# Patient Record
Sex: FEMALE | Race: BLACK OR AFRICAN AMERICAN | NOT HISPANIC OR LATINO | Employment: UNEMPLOYED | URBAN - METROPOLITAN AREA
[De-identification: names, ages, dates, MRNs, and addresses within clinical notes are randomized per-mention and may not be internally consistent; named-entity substitution may affect disease eponyms.]

---

## 2020-02-26 ENCOUNTER — APPOINTMENT (OUTPATIENT)
Dept: GENERAL RADIOLOGY | Facility: CLINIC | Age: 35
End: 2020-02-26
Attending: EMERGENCY MEDICINE
Payer: MEDICAID

## 2020-02-26 ENCOUNTER — HOSPITAL ENCOUNTER (EMERGENCY)
Facility: CLINIC | Age: 35
Discharge: HOME | End: 2020-02-26
Attending: EMERGENCY MEDICINE | Admitting: EMERGENCY MEDICINE
Payer: MEDICAID

## 2020-02-26 VITALS
SYSTOLIC BLOOD PRESSURE: 137 MMHG | HEART RATE: 76 BPM | OXYGEN SATURATION: 100 % | DIASTOLIC BLOOD PRESSURE: 90 MMHG | TEMPERATURE: 96.8 F | RESPIRATION RATE: 16 BRPM

## 2020-02-26 DIAGNOSIS — R07.89 ATYPICAL CHEST PAIN: ICD-10-CM

## 2020-02-26 LAB
ALBUMIN SERPL-MCNC: 3.6 G/DL (ref 3.4–5)
ALP SERPL-CCNC: 123 U/L (ref 40–150)
ALT SERPL W P-5'-P-CCNC: 25 U/L (ref 0–50)
ANION GAP SERPL CALCULATED.3IONS-SCNC: 3 MMOL/L (ref 3–14)
AST SERPL W P-5'-P-CCNC: 14 U/L (ref 0–45)
BASOPHILS # BLD AUTO: 0.1 10E9/L (ref 0–0.2)
BASOPHILS NFR BLD AUTO: 0.8 %
BILIRUB SERPL-MCNC: 0.4 MG/DL (ref 0.2–1.3)
BUN SERPL-MCNC: 10 MG/DL (ref 7–30)
CALCIUM SERPL-MCNC: 9.2 MG/DL (ref 8.5–10.1)
CHLORIDE SERPL-SCNC: 102 MMOL/L (ref 94–109)
CO2 SERPL-SCNC: 30 MMOL/L (ref 20–32)
CREAT SERPL-MCNC: 0.56 MG/DL (ref 0.52–1.04)
D DIMER PPP FEU-MCNC: 0.3 UG/ML FEU (ref 0–0.5)
DIFFERENTIAL METHOD BLD: ABNORMAL
EOSINOPHIL # BLD AUTO: 0.2 10E9/L (ref 0–0.7)
EOSINOPHIL NFR BLD AUTO: 2.4 %
ERYTHROCYTE [DISTWIDTH] IN BLOOD BY AUTOMATED COUNT: 14.4 % (ref 10–15)
GFR SERPL CREATININE-BSD FRML MDRD: >90 ML/MIN/{1.73_M2}
GLUCOSE SERPL-MCNC: 129 MG/DL (ref 70–99)
HCG SERPL QL: NEGATIVE
HCT VFR BLD AUTO: 43.4 % (ref 35–47)
HGB BLD-MCNC: 13.4 G/DL (ref 11.7–15.7)
IMM GRANULOCYTES # BLD: 0 10E9/L (ref 0–0.4)
IMM GRANULOCYTES NFR BLD: 0.1 %
LIPASE SERPL-CCNC: 49 U/L (ref 73–393)
LYMPHOCYTES # BLD AUTO: 3.6 10E9/L (ref 0.8–5.3)
LYMPHOCYTES NFR BLD AUTO: 48 %
MCH RBC QN AUTO: 23.8 PG (ref 26.5–33)
MCHC RBC AUTO-ENTMCNC: 30.9 G/DL (ref 31.5–36.5)
MCV RBC AUTO: 77 FL (ref 78–100)
MONOCYTES # BLD AUTO: 0.5 10E9/L (ref 0–1.3)
MONOCYTES NFR BLD AUTO: 6.3 %
NEUTROPHILS # BLD AUTO: 3.2 10E9/L (ref 1.6–8.3)
NEUTROPHILS NFR BLD AUTO: 42.4 %
NRBC # BLD AUTO: 0 10*3/UL
NRBC BLD AUTO-RTO: 0 /100
PLATELET # BLD AUTO: 436 10E9/L (ref 150–450)
POTASSIUM SERPL-SCNC: 3.1 MMOL/L (ref 3.4–5.3)
PROT SERPL-MCNC: 8.1 G/DL (ref 6.8–8.8)
RBC # BLD AUTO: 5.64 10E12/L (ref 3.8–5.2)
SODIUM SERPL-SCNC: 135 MMOL/L (ref 133–144)
TROPONIN I SERPL-MCNC: <0.015 UG/L (ref 0–0.04)
WBC # BLD AUTO: 7.6 10E9/L (ref 4–11)

## 2020-02-26 PROCEDURE — 84703 CHORIONIC GONADOTROPIN ASSAY: CPT | Performed by: EMERGENCY MEDICINE

## 2020-02-26 PROCEDURE — 85025 COMPLETE CBC W/AUTO DIFF WBC: CPT | Performed by: EMERGENCY MEDICINE

## 2020-02-26 PROCEDURE — 83690 ASSAY OF LIPASE: CPT | Performed by: EMERGENCY MEDICINE

## 2020-02-26 PROCEDURE — 85379 FIBRIN DEGRADATION QUANT: CPT | Performed by: EMERGENCY MEDICINE

## 2020-02-26 PROCEDURE — 25000132 ZZH RX MED GY IP 250 OP 250 PS 637: Performed by: EMERGENCY MEDICINE

## 2020-02-26 PROCEDURE — 93005 ELECTROCARDIOGRAM TRACING: CPT

## 2020-02-26 PROCEDURE — 25000125 ZZHC RX 250: Performed by: EMERGENCY MEDICINE

## 2020-02-26 PROCEDURE — 99285 EMERGENCY DEPT VISIT HI MDM: CPT | Mod: 25

## 2020-02-26 PROCEDURE — 80053 COMPREHEN METABOLIC PANEL: CPT | Performed by: EMERGENCY MEDICINE

## 2020-02-26 PROCEDURE — 84484 ASSAY OF TROPONIN QUANT: CPT | Performed by: EMERGENCY MEDICINE

## 2020-02-26 PROCEDURE — 71046 X-RAY EXAM CHEST 2 VIEWS: CPT

## 2020-02-26 RX ORDER — POTASSIUM CHLORIDE 1.5 G/1.58G
40 POWDER, FOR SOLUTION ORAL ONCE
Status: COMPLETED | OUTPATIENT
Start: 2020-02-26 | End: 2020-02-26

## 2020-02-26 RX ORDER — LIDOCAINE HYDROCHLORIDE 20 MG/ML
15 SOLUTION OROPHARYNGEAL ONCE
Status: COMPLETED | OUTPATIENT
Start: 2020-02-26 | End: 2020-02-26

## 2020-02-26 RX ORDER — ASPIRIN 81 MG/1
324 TABLET, CHEWABLE ORAL ONCE
Status: COMPLETED | OUTPATIENT
Start: 2020-02-26 | End: 2020-02-26

## 2020-02-26 RX ORDER — ALUMINA, MAGNESIA, AND SIMETHICONE 2400; 2400; 240 MG/30ML; MG/30ML; MG/30ML
15 SUSPENSION ORAL ONCE
Status: COMPLETED | OUTPATIENT
Start: 2020-02-26 | End: 2020-02-26

## 2020-02-26 RX ADMIN — ALUMINUM HYDROXIDE, MAGNESIUM HYDROXIDE, AND DIMETHICONE 15 ML: 400; 400; 40 SUSPENSION ORAL at 22:40

## 2020-02-26 RX ADMIN — POTASSIUM CHLORIDE 40 MEQ: 1.5 POWDER, FOR SOLUTION ORAL at 23:31

## 2020-02-26 RX ADMIN — LIDOCAINE HYDROCHLORIDE 15 ML: 20 SOLUTION ORAL; TOPICAL at 22:39

## 2020-02-26 RX ADMIN — ASPIRIN 81 MG 324 MG: 81 TABLET ORAL at 22:41

## 2020-02-26 ASSESSMENT — ENCOUNTER SYMPTOMS
ABDOMINAL PAIN: 1
SHORTNESS OF BREATH: 0

## 2020-02-26 NOTE — ED AVS SNAPSHOT
[facility] Department  [address]  [facility] [address]  Phone:  [phone]  Fax:  [phone]                                    mamta stone   MRN: [id number]    Department:  [facility]   Date of Visit:  2/26/2020           After Visit Summary Signature Page    I have received my discharge instructions, and my questions have been answered. I have discussed any challenges I see with this plan with the nurse or doctor.    ..........................................................................................................................................  Patient/Patient Representative Signature      ..........................................................................................................................................  Patient Representative Print Name and Relationship to Patient    ..................................................               ................................................  Date                                   Time    ..........................................................................................................................................  Reviewed by Signature/Title    ...................................................              ..............................................  Date                                               Time          22EPIC Rev 08/18

## 2020-02-27 LAB — INTERPRETATION ECG - MUSE: NORMAL

## 2020-02-27 NOTE — ED TRIAGE NOTES
Pt in with C/O chest pain onset 2 days ago. Pt denies pain at this time. Reports pain is worse at night when lying down. Pt A&Ox4 ABCD's intact

## 2020-02-27 NOTE — ED PROVIDER NOTES
History     Chief Complaint:  Chest Pain     HPI   mamta stone is a 25 year old female with a history of diabetes, HTN who presents to the emergency department for evaluation of chest pain. The patient reports she has experienced 3 days of intermittent left anterior chest pain. She states the pain was bothering her prior to going to bed, and she had increased anxiety, prompting her arrival to the ED. She further reports some epigastric abdominal pain. She states the pain worsens with deep breaths, denies any shortness of breath. Of note, the patient recently returned from a trip to [place] on 2/6/2020.    Allergies:  NKDA     Medications:    The patient is currently on no regular medications.     Past Medical History:    Diabetes  HTN    Past Surgical History:    The patient does not have any pertinent past surgical history.    Family History:    No past pertinent family history.    Social History:  Presents alone.  Never smoker.    Review of Systems   Respiratory: Negative for shortness of breath.    Cardiovascular: Positive for chest pain.   Gastrointestinal: Positive for abdominal pain.   All other systems reviewed and are negative.        Physical Exam     Patient Vitals for the past 24 hrs:   BP Temp Pulse Heart Rate Resp SpO2   02/26/20 2300 (!) 137/90 -- 76 79 16 100 %   02/26/20 2245 (!) 111/92 -- 101 81 15 100 %   02/26/20 2230 (!) 142/97 -- 75 78 12 100 %   02/26/20 2212 (!) 177/107 96.8  F (36  C) 84 -- 16 100 %     Physical Exam  Constitutional:  Oriented to person, place, and time. Well-appearing.  HENT:   Head:    Normocephalic.   Mouth/Throat:   Oropharynx is clear and moist.   Eyes:    EOM are normal. Pupils are equal, round, and reactive to light.   Neck:    Neck supple.   Cardiovascular:  Normal rate, regular rhythm and normal heart sounds.      Exam reveals no gallop and no friction rub.       No murmur heard.  Pulmonary/Chest:  Effort normal and breath sounds normal.      No respiratory  distress. No wheezes. No rales.      No reproducible chest wall pain.  Abdominal:   Soft. No distension. No tenderness. No rebound and no guarding.   Musculoskeletal:  Normal range of motion. 2+ distal equal pulses.  No leg calf tenderness, swelling or edema.  Neurological:   Alert and oriented to person, place, and time.           Moves all 4 extremities spontaneously    Skin:    No rash noted. No pallor.     Emergency Department Course   ECG:  Time: 2224  Vent. Rate 75 bpm. AR interval 166. QRS duration 74. QT/QTc 368/410. P-R-T axis 51 18 33.  Normal sinus rhythm.  Normal ECG.  Read time: 2225    Imaging:  Radiology findings were communicated with the patient who voiced understanding of the findings.    XR Chest, 2 Views:  Negative chest.  As per radiology.    Laboratory:  Laboratory findings were communicated with the patient who voiced understanding of the findings.    CBC: WBC: 7.6, HGB: 13.4, PLT: 136  CMP: Glucose 129 (H), Potassium 3.1 (L), o/w WNL (Creatinine: 0.56)  Lipase: 49 (L)    D dimer: 0.3    HCG Qualitative Blood: Negative    2227 Troponin I: <0.015    Interventions:  2239 GI Cocktail 30 mL PO  2241 Aspirin 324 mg PO    Emergency Department Course:  Past medical records, nursing notes, and vitals reviewed.    2228 I performed an exam of the patient as documented above.     EKG obtained in the ED, see results above.     IV was inserted and blood was drawn for laboratory testing, results above.    The patient was sent for a XR Chest while in the emergency department, results above.     2328 I rechecked the patient and discussed the results of her workup thus far.     Findings and plan explained to the Patient. Patient discharged home with instructions regarding supportive care, medications, and reasons to return. The importance of close follow-up was reviewed.     I personally reviewed the laboratory results with the Patient and answered all related questions prior to discharge.     Impression &  Plan     Medical Decision Making:  mamta stone is a 35 year old female who presents with chest pain.  Today's work up in the Emergency Department is negative.  The differential diagnosis of chest pain is broad and includes life threatening etiologies such as acute coronary syndrome, myocardial infarction, pulmonary embolism, acute aortic dissection, amongst others.  The patient's EKG was nonischemic and troponin was normal.  The chest pain symptom complex would be atypical for coronary ischemia.  The patient is low risk for PE and has a negative D-dimer; I feel the risks of CT imaging outweighs any benefits.  Chest xray reveals no evidence of pneumonia or pneumothorax.  No serious etiology for the chest pain were detected today during this visit.   Close follow up with primary care is indicated should the pain continue, as further work up may be performed; this was made clear to the patient, who understands.     Diagnosis:    ICD-10-CM   1. Atypical chest pain R07.89       Disposition:  Discharged to home.    Scribe Disclosure:  Keith JENSEN, am serving as a scribe at 10:24 PM on 2/26/2020 to document services personally performed by Steffen Lundberg MD based on my observations and the provider's statements to me.     [facility] EMERGENCY DEPARTMENT     Steffen Lundberg MD  02/27/20 0242

## 2020-03-10 ENCOUNTER — HOSPITAL ENCOUNTER (EMERGENCY)
Facility: CLINIC | Age: 35
Discharge: HOME | End: 2020-03-10
Attending: EMERGENCY MEDICINE | Admitting: EMERGENCY MEDICINE
Payer: MEDICAID

## 2020-03-10 VITALS
TEMPERATURE: 98 F | SYSTOLIC BLOOD PRESSURE: 135 MMHG | RESPIRATION RATE: 18 BRPM | DIASTOLIC BLOOD PRESSURE: 89 MMHG | WEIGHT: 222 LBS | OXYGEN SATURATION: 97 %

## 2020-03-10 DIAGNOSIS — M25.512 ACUTE PAIN OF LEFT SHOULDER: ICD-10-CM

## 2020-03-10 DIAGNOSIS — N64.4 BREAST PAIN: ICD-10-CM

## 2020-03-10 PROCEDURE — 99282 EMERGENCY DEPT VISIT SF MDM: CPT

## 2020-03-10 RX ORDER — IBUPROFEN 200 MG
400 TABLET ORAL EVERY 8 HOURS PRN
Qty: 60 TABLET | Refills: 0 | Status: SHIPPED | OUTPATIENT
Start: 2020-03-10 | End: 2024-02-22

## 2020-03-10 RX ORDER — TRAMADOL HYDROCHLORIDE 50 MG/1
50 TABLET ORAL EVERY 6 HOURS PRN
Qty: 10 TABLET | Refills: 0 | Status: SHIPPED | OUTPATIENT
Start: 2020-03-10 | End: 2020-03-13

## 2020-03-10 NOTE — ED AVS SNAPSHOT
[facility] Emergency Department  [address]  [facility] [address]  Phone:  [phone]  Fax:  [phone]                                    mamta stone   MRN: [id number]    Department:  [facility] Emergency Department   Date of Visit:  3/10/2020           After Visit Summary Signature Page    I have received my discharge instructions, and my questions have been answered. I have discussed any challenges I see with this plan with the nurse or doctor.    ..........................................................................................................................................  Patient/Patient Representative Signature      ..........................................................................................................................................  Patient Representative Print Name and Relationship to Patient    ..................................................               ................................................  Date                                   Time    ..........................................................................................................................................  Reviewed by Signature/Title    ...................................................              ..............................................  Date                                               Time          22EPIC Rev 08/18

## 2020-03-11 NOTE — ED PROVIDER NOTES
History     Chief Complaint:  Breast Pain    HPI   mamta stone is a 35 year old female who presents with left breast pain. The patient reports two days ago, she noticed bumps on her left breast and today started to experience pain. She notes she typically has breast tenderness with her periods but notes she is not on her period and denies other pain similar to this in the past. She notes she has felt hot but denies rashes, nipple discharge, recent injury to her breast, and chance of pregnancy. She reports her breast hurts more when she raises her arm.     Allergies:  No known drug allergies    Medications:    The patient is not currently taking any prescribed medications.    Past Medical History:    The patient does not have any past pertinent medical history.    Past Surgical History:    History reviewed. No pertinent surgical history.    Family History:    History reviewed. No pertinent family history.    Social History:  PCP: Physician No Ref-Primary  Presents to the ED by herself  Marital Status:   [2]    Review of Systems   Musculoskeletal:        Left breast pain   Skin: Negative for rash.   All other systems reviewed and are negative.    Physical Exam     Patient Vitals for the past 24 hrs:   BP Temp Temp src Resp SpO2 Weight   03/10/20 2006 135/89 98  F (36.7  C) Temporal 18 97 % 100.7 kg (222 lb)     Physical Exam  General: Patient is alert and interactive when I enter the room  Head:  The scalp, face, and head appear normal  Eyes:  Conjunctivae are normal  ENT:    The nose is normal    Pinnae are normal    External acoustic canals are normal  Neck:  Trachea midline  CV:  Pulses are normal.    Resp:  No respiratory distress   Breast:  Tenderness to left lateral upper breast with mild firmness palpated in this area, no overlying skin changes, no nipple discharge, no fluctuance   Abdomen:      Soft, non-tender, non-distended  Musc:  Normal muscular tone    No major joint effusions    No asymmetric  leg swelling    Tenderness to left proximal bicep insertion, ROM intact although with pain     No warmth or effusion  Skin:  No rash or lesions noted  Neuro:  Speech is normal and fluent. Face is symmetric.     Moving all extremities well.   Psych: Awake. Alert.  Normal affect.  Appropriate interactions.    Emergency Department Course   Procedures:  None    Emergency Department Course:  Past medical records, nursing notes, and vitals reviewed.  2048: I performed an exam of the patient and obtained history, as documented above.    Findings and plan explained to the Patient. Patient discharged home with instructions regarding supportive care, medications, and reasons to return. The importance of close follow-up was reviewed.     Impression & Plan    Medical Decision Making:  Sheila stone is a 36 yo F with a history of DM who presents with breast pain and left shoulder pain.  She does have some firmness in her left upper lateral breast.  There are no signs of infection such as cellulitis or abscess.  This could be fibrocystic changes she does report she does have breast pain around her..  I think she needs breast imaging which she has not had recently.  I will refer her to the breast center so she can get either mammogram or ultrasound depending on which they feel would be best for her.  She also complained of left shoulder pain she has significant tenderness on her left bicep insertion.  She could have some degree of biceps tendinitis.  I instruction ibuprofen for pain control.  Gave her a short course of tramadol as well he actually has an appointment with her primary on Friday and I discussed she can return to [facility] and get further work-up at that time.  I doubt cardiac etiology as she has reproducible pain and description of her pain seems very musculoskeletal in nature.  Patient discharged    Diagnosis:    ICD-10-CM    1. Breast pain  N64.4    2. Acute pain of left shoulder  M25.512       Disposition:  discharged to home    Discharge Medications:  Discharge Medication List as of 3/10/2020  9:23 PM      START taking these medications    Details   ibuprofen (ADVIL/MOTRIN) 200 MG tablet Take 2 tablets (400 mg) by mouth every 8 hours as needed for pain, Disp-60 tablet,R-0, Local Print           Roberto Patton  3/10/2020   [facility] EMERGENCY DEPARTMENT  I, Roberto Patton, am serving as a scribe at 8:48 PM on 3/10/2020 to document services personally performed by Sonia Velasquez MD based on my observations and the provider's statements to me.      Sonia Velasquez MD  03/12/20 6514

## 2020-03-11 NOTE — DISCHARGE INSTRUCTIONS
Need to make an appointment with the breast clinic.  You likely need a mammogram and/or ultrasound.  You also need to follow-up with your primary on Friday to discuss your left shoulder pain.  You could have bicep tendinitis.  The treatment of this is NSAIDs and rest.  Fevers or worsening pain.

## 2022-04-24 ENCOUNTER — HOSPITAL ENCOUNTER (EMERGENCY)
Facility: CLINIC | Age: 37
Discharge: HOME | End: 2022-04-24
Attending: PHYSICIAN ASSISTANT | Admitting: PHYSICIAN ASSISTANT
Payer: COMMERCIAL

## 2022-04-24 VITALS
OXYGEN SATURATION: 100 % | HEART RATE: 106 BPM | DIASTOLIC BLOOD PRESSURE: 94 MMHG | TEMPERATURE: 98.1 F | RESPIRATION RATE: 18 BRPM | SYSTOLIC BLOOD PRESSURE: 134 MMHG

## 2022-04-24 DIAGNOSIS — J02.9 SORE THROAT: ICD-10-CM

## 2022-04-24 LAB
DEPRECATED S PYO AG THROAT QL EIA: NEGATIVE
FLUAV RNA SPEC QL NAA+PROBE: NEGATIVE
FLUBV RNA RESP QL NAA+PROBE: NEGATIVE
RSV RNA SPEC NAA+PROBE: NEGATIVE
SARS-COV-2 RNA RESP QL NAA+PROBE: NEGATIVE

## 2022-04-24 PROCEDURE — C9803 HOPD COVID-19 SPEC COLLECT: HCPCS

## 2022-04-24 PROCEDURE — 87651 STREP A DNA AMP PROBE: CPT | Performed by: PHYSICIAN ASSISTANT

## 2022-04-24 PROCEDURE — 250N000013 HC RX MED GY IP 250 OP 250 PS 637: Performed by: PHYSICIAN ASSISTANT

## 2022-04-24 PROCEDURE — 99283 EMERGENCY DEPT VISIT LOW MDM: CPT

## 2022-04-24 PROCEDURE — 87637 SARSCOV2&INF A&B&RSV AMP PRB: CPT | Performed by: PHYSICIAN ASSISTANT

## 2022-04-24 RX ORDER — ACETAMINOPHEN 325 MG/1
650 TABLET ORAL ONCE
Status: COMPLETED | OUTPATIENT
Start: 2022-04-24 | End: 2022-04-24

## 2022-04-24 RX ADMIN — ACETAMINOPHEN 650 MG: 325 TABLET, FILM COATED ORAL at 21:24

## 2022-04-24 ASSESSMENT — ENCOUNTER SYMPTOMS
COUGH: 0
RHINORRHEA: 0
VOICE CHANGE: 0
SORE THROAT: 1
HEADACHES: 0
MYALGIAS: 0
FEVER: 0

## 2022-04-25 LAB — GROUP A STREP BY PCR: NOT DETECTED

## 2022-04-25 NOTE — ED PROVIDER NOTES
History   Chief Complaint:  Pharyngitis       HPI   Sheila Vazquez is a 37 year old female with history of hypertension who presents with sore throat with associated pain while swallowing for the last 2 days. She notes that she had recurrent strep throat when she was in high school. Denies runny nose, headaches, fever, myalgias, cough, or voice changes. No other sick contacts.     Review of Systems   Constitutional: Negative for fever.   HENT: Positive for sore throat. Negative for rhinorrhea and voice change.    Respiratory: Negative for cough.    Musculoskeletal: Negative for myalgias.   Neurological: Negative for headaches.   All other systems reviewed and are negative.    Allergies:  The patient has no known allergies.     Medications:  Depo-provera    Past Medical History:     Type 2 diabetes  Hyperlipidemia  Hypertension    Past Surgical History:     section  Female circumcision  Gastric bypass    Family History:    Mother: Diabetes  Sister: Anxiety, type 2 diabetes    Social History:  The patient presents to the ED alone.   Has a 1 year old at home.     Physical Exam     Patient Vitals for the past 24 hrs:   BP Temp Temp src Pulse Resp SpO2   22 2055 (!) 134/94 98.1  F (36.7  C) Temporal 106 18 100 %     Physical Exam  General: Well appearing, pleasant female, resting on exam bed  HEENT: No evidence of trauma.  Conjunctive are clear. Neck range of motion intact.  Nose and throat clear.  Erythematous posterior oropharynx. No PTA or neck tenderness.   Respiratory: Good effort  Cardiovascular: Good distal perfusion  Gastrointestinal: Nondistended  Musculoskeletal: Atraumatic  Skin: Exposed skin clear.  Neurologic: Alert.  Psych:  Patient is cooperative, with normal affect.    Emergency Department Course     Laboratory:  Labs Ordered and Resulted from Time of ED Arrival to Time of ED Departure   INFLUENZA A/B & SARS-COV2 PCR MULTIPLEX - Normal       Result Value    Influenza A PCR Negative       Influenza B PCR Negative      RSV PCR Negative      SARS CoV2 PCR Negative     STREPTOCOCCUS A RAPID SCREEN W REFELX TO PCR - Normal    Group A Strep antigen Negative     GROUP A STREPTOCOCCUS PCR THROAT SWAB      Emergency Department Course:         Reviewed:  I reviewed nursing notes, vitals and past medical history    Assessments:  2108 I obtained history and examined the patient as noted above.     2214 I rechecked the patient and explained findings.     Interventions:  2124 Tylenol 650 mg PO    Disposition:  The patient was discharged to home.     Impression & Plan     Medical Decision Making:  Sheila Vazquez is a 37 year old female who presented with sore throat and clinical evidence of pharyngitis.  The rapid strep test is negative, and formal PCR has been set up in the lab. There is no clinical evidence of  peritonsillar abscess, retropharyngeal abscess, Lemierre's Syndrome, epiglottis, or Lazaro's angina. The etiology is most likely viral.  The patient's symptoms are consistent with viral pharyngitis.  I have recommended treatment with analgesics, and we will await formal PCR results.  If the culture is positive, someone will call the patient to initiate anti-microbial therapy. Return if increasing pain, change in voice, neck pain, vomiting, fever, or shortness of breath. Follow-up with primary physician if not improving in 3-5 days. covid and influenza pending.     Covid-19  Sheila Vazquez was evaluated during a global COVID-19 pandemic, which necessitated consideration that the patient might be at risk for infection with the SARS-CoV-2 virus that causes COVID-19.   Applicable protocols for evaluation were followed during the patient's care.   COVID-19 was considered as part of the patient's evaluation. The plan for testing is:  a test was obtained during this visit.    Diagnosis:    ICD-10-CM    1. Sore throat  J02.9      Scribe Disclosure:  Freddy JENSEN, am serving as a scribe at 9:10 PM on 4/24/2022  to document services personally performed by Rigoberto Carter PA-C based on my observations and the provider's statements to me.         Rigoberto Carter PA-C  04/24/22 [id number]

## 2022-11-26 ENCOUNTER — HOSPITAL ENCOUNTER (EMERGENCY)
Facility: CLINIC | Age: 37
Discharge: HOME | End: 2022-11-26
Attending: INTERNAL MEDICINE | Admitting: INTERNAL MEDICINE
Payer: COMMERCIAL

## 2022-11-26 VITALS
TEMPERATURE: 98 F | RESPIRATION RATE: 14 BRPM | OXYGEN SATURATION: 100 % | SYSTOLIC BLOOD PRESSURE: 127 MMHG | WEIGHT: 210.4 LBS | HEART RATE: 74 BPM | DIASTOLIC BLOOD PRESSURE: 73 MMHG

## 2022-11-26 DIAGNOSIS — D64.9 ANEMIA, UNSPECIFIED TYPE: ICD-10-CM

## 2022-11-26 DIAGNOSIS — N93.8 DYSFUNCTIONAL UTERINE BLEEDING: ICD-10-CM

## 2022-11-26 LAB
ANION GAP SERPL CALCULATED.3IONS-SCNC: 11 MMOL/L (ref 7–15)
BASOPHILS # BLD AUTO: 0 10E3/UL (ref 0–0.2)
BASOPHILS NFR BLD AUTO: 1 %
BUN SERPL-MCNC: 13.6 MG/DL (ref 6–20)
CALCIUM SERPL-MCNC: 8.6 MG/DL (ref 8.6–10)
CHLORIDE SERPL-SCNC: 104 MMOL/L (ref 98–107)
CREAT SERPL-MCNC: 0.6 MG/DL (ref 0.51–0.95)
DEPRECATED HCO3 PLAS-SCNC: 22 MMOL/L (ref 22–29)
EOSINOPHIL # BLD AUTO: 0.1 10E3/UL (ref 0–0.7)
EOSINOPHIL NFR BLD AUTO: 1 %
ERYTHROCYTE [DISTWIDTH] IN BLOOD BY AUTOMATED COUNT: 18.9 % (ref 10–15)
GFR SERPL CREATININE-BSD FRML MDRD: >90 ML/MIN/1.73M2
GLUCOSE SERPL-MCNC: 167 MG/DL (ref 70–99)
HCG SERPL QL: NEGATIVE
HCT VFR BLD AUTO: 28.4 % (ref 35–47)
HGB BLD-MCNC: 7 G/DL (ref 11.7–15.7)
HOLD SPECIMEN: NORMAL
IMM GRANULOCYTES # BLD: 0 10E3/UL
IMM GRANULOCYTES NFR BLD: 0 %
LYMPHOCYTES # BLD AUTO: 2.1 10E3/UL (ref 0.8–5.3)
LYMPHOCYTES NFR BLD AUTO: 44 %
MCH RBC QN AUTO: 15.1 PG (ref 26.5–33)
MCHC RBC AUTO-ENTMCNC: 24.6 G/DL (ref 31.5–36.5)
MCV RBC AUTO: 61 FL (ref 78–100)
MONOCYTES # BLD AUTO: 0.4 10E3/UL (ref 0–1.3)
MONOCYTES NFR BLD AUTO: 8 %
NEUTROPHILS # BLD AUTO: 2.2 10E3/UL (ref 1.6–8.3)
NEUTROPHILS NFR BLD AUTO: 46 %
NRBC # BLD AUTO: 0 10E3/UL
NRBC BLD AUTO-RTO: 0 /100
PLAT MORPH BLD: NORMAL
PLATELET # BLD AUTO: 397 10E3/UL (ref 150–450)
POTASSIUM SERPL-SCNC: 3.7 MMOL/L (ref 3.4–5.3)
RBC # BLD AUTO: 4.64 10E6/UL (ref 3.8–5.2)
RBC MORPH BLD: NORMAL
SODIUM SERPL-SCNC: 137 MMOL/L (ref 136–145)
TROPONIN T SERPL HS-MCNC: 6 NG/L
WBC # BLD AUTO: 4.9 10E3/UL (ref 4–11)

## 2022-11-26 PROCEDURE — 258N000003 HC RX IP 258 OP 636: Performed by: INTERNAL MEDICINE

## 2022-11-26 PROCEDURE — 93005 ELECTROCARDIOGRAM TRACING: CPT

## 2022-11-26 PROCEDURE — 36415 COLL VENOUS BLD VENIPUNCTURE: CPT | Performed by: INTERNAL MEDICINE

## 2022-11-26 PROCEDURE — 85025 COMPLETE CBC W/AUTO DIFF WBC: CPT | Performed by: INTERNAL MEDICINE

## 2022-11-26 PROCEDURE — 96360 HYDRATION IV INFUSION INIT: CPT

## 2022-11-26 PROCEDURE — 84703 CHORIONIC GONADOTROPIN ASSAY: CPT | Performed by: INTERNAL MEDICINE

## 2022-11-26 PROCEDURE — 96361 HYDRATE IV INFUSION ADD-ON: CPT

## 2022-11-26 PROCEDURE — 84484 ASSAY OF TROPONIN QUANT: CPT | Performed by: INTERNAL MEDICINE

## 2022-11-26 PROCEDURE — 99284 EMERGENCY DEPT VISIT MOD MDM: CPT

## 2022-11-26 PROCEDURE — 80048 BASIC METABOLIC PNL TOTAL CA: CPT | Performed by: INTERNAL MEDICINE

## 2022-11-26 RX ORDER — FERROUS GLUCONATE 324(38)MG
324 TABLET ORAL 2 TIMES DAILY WITH MEALS
Qty: 60 TABLET | Refills: 0 | Status: SHIPPED | OUTPATIENT
Start: 2022-11-26

## 2022-11-26 RX ADMIN — SODIUM CHLORIDE 1000 ML: 9 INJECTION, SOLUTION INTRAVENOUS at 16:47

## 2022-11-26 ASSESSMENT — ENCOUNTER SYMPTOMS
DIZZINESS: 1
DIARRHEA: 0

## 2022-11-26 ASSESSMENT — ACTIVITIES OF DAILY LIVING (ADL): ADLS_ACUITY_SCORE: 35

## 2022-11-26 NOTE — ED PROVIDER NOTES
History   Chief Complaint:  Dizziness       HPI   Sheila Vazquez is a 37 year old female with history of hypertension, type II diabetes mellitus, vasovagal syncope, anemia who presents via EMS with dizziness. She was shopping in a store and when she was at the  she was dizzy and felt like her head was spinning and that she needed to sit down. She was not dressed too warm and was not hot in the store. Denies recent illness, diarrhea or leg swelling. She denies chance of pregnancy as she is currently on her menstrual period. She reports that she feels like her hearing is blocked in both of her ears.     Review of Systems   Cardiovascular: Negative for leg swelling.   Gastrointestinal: Negative for diarrhea.   Neurological: Positive for dizziness.   All other systems reviewed and are negative.      Allergies:  The patient has no known allergies.     Medications:  Glucophage  Jardiance  Zestril  Ferrous sulfate  Lipitor    Past Medical History:     Hypertension  Vasovagal syncope  Acute blood loss anemia  Chronic idiopathic constipation  Hyperlipidemia   Type II diabetes mellitus  Kidney stones  Arrhythmia    Past Surgical History:     section  Gastric bypass  Female circumcision    Family History:    Mother - hypertension, diabetes  Sister - type II diabetes, heart disease, anxiety    Social History:  The patient presents to the ED with her daughter  PCP: No Ref-Primary, Physician       Physical Exam     Patient Vitals for the past 24 hrs:   BP Temp Temp src Pulse Resp SpO2 Weight   22 -- -- -- -- -- 100 % --   22 181 -- -- -- -- -- 100 % --   22 1817 -- -- -- -- -- 100 % --   22 1816 -- -- -- -- -- 100 % --   22 1815 127/73 -- -- 74 -- 100 % --   22 1629 104/62 -- -- -- -- -- --   22 1623 -- 98  F (36.7  C) Oral 80 14 100 % 95.4 kg (210 lb 6.4 oz)       Physical Exam  Constitutional:       Comments: Pleasant and cooperative   HENT:      Mouth/Throat:       Pharynx: No posterior oropharyngeal erythema.   Eyes:      Conjunctiva/sclera: Conjunctivae normal.   Cardiovascular:      Rate and Rhythm: Normal rate and regular rhythm.      Heart sounds: Normal heart sounds.   Pulmonary:      Effort: Pulmonary effort is normal.      Breath sounds: Normal breath sounds.   Abdominal:      General: Bowel sounds are normal. There is no distension.      Palpations: Abdomen is soft.      Tenderness: There is no abdominal tenderness. There is no guarding or rebound.   Musculoskeletal:         General: Normal range of motion.      Cervical back: Neck supple.   Skin:     General: Skin is warm and dry.   Neurological:      Mental Status: She is alert.         Emergency Department Course   ECG  ECG results from 11/26/22   EKG 12-lead, tracing only     Value    Systolic Blood Pressure     Diastolic Blood Pressure     Ventricular Rate 82    Atrial Rate 82    NE Interval 162    QRS Duration 70        QTc 436    P Axis 41    R AXIS 12    T Axis 16    Interpretation ECG      Sinus rhythm  Nonspecific T wave abnormality  Abnormal ECG  When compared with ECG of 26-FEB-2020 22:24,  Nonspecific T wave abnormality now evident in Anterior leads         Laboratory:  Labs Ordered and Resulted from Time of ED Arrival to Time of ED Departure   BASIC METABOLIC PANEL - Abnormal       Result Value    Sodium 137      Potassium 3.7      Chloride 104      Carbon Dioxide (CO2) 22      Anion Gap 11      Urea Nitrogen 13.6      Creatinine 0.60      Calcium 8.6      Glucose 167 (*)     GFR Estimate >90     CBC WITH PLATELETS AND DIFFERENTIAL - Abnormal    WBC Count 4.9      RBC Count 4.64      Hemoglobin 7.0 (*)     Hematocrit 28.4 (*)     MCV 61 (*)     MCH 15.1 (*)     MCHC 24.6 (*)     RDW 18.9 (*)     Platelet Count 397      % Neutrophils 46      % Lymphocytes 44      % Monocytes 8      % Eosinophils 1      % Basophils 1      % Immature Granulocytes 0      NRBCs per 100 WBC 0      Absolute Neutrophils  2.2      Absolute Lymphocytes 2.1      Absolute Monocytes 0.4      Absolute Eosinophils 0.1      Absolute Basophils 0.0      Absolute Immature Granulocytes 0.0      Absolute NRBCs 0.0     TROPONIN T, HIGH SENSITIVITY - Normal    Troponin T, High Sensitivity 6     HCG QUALITATIVE PREGNANCY - Normal    hCG Serum Qualitative Negative     RBC AND PLATELET MORPHOLOGY    Platelet Assessment        Value: Automated Count Confirmed. Platelet morphology is normal.    RBC Morphology Confirmed RBC Indices          Emergency Department Course:         Reviewed:  I reviewed nursing notes, vitals, past medical history and Care Everywhere    Assessments:  1637 I obtained history and examined the patient as noted above.   1840 I rechecked the patient and explained findings.     Interventions:  1647 NS bolus 1L IV    Disposition:  The patient was discharged to home.     Impression & Plan     Medical Decision Making:    Sheila Vazquez is a 37 year old female who presents to the emergency department after an episode of syncope or near syncope while at the store.  Evaluation here is remarkable for anemia with a hemoglobin of 7.0.  She has recently been evaluated for dysfunctional uterine bleeding, and I suspect her anemia is related to menstrual losses.  I discussed with her breath her degree of anemia and her syncopal and near syncopal episode today it would be prudent to give her blood transfusion, especially given her ongoing bleeding.  She clarifies that she is not having any current vaginal bleeding.  After discussion of risks and benefits she declines but will manage with oral iron.  I will have her follow-up closely with primary care.  She should drink plenty of fluid.      Diagnosis:    ICD-10-CM    1. Anemia, unspecified type  D64.9       2. Dysfunctional uterine bleeding  N93.8           Discharge Medications:  New Prescriptions    FERROUS GLUCONATE (FERGON) 324 (38 FE) MG TABLET    Take 1 tablet (324 mg) by mouth 2 times  daily (with meals)       Scribe Disclosure:  I, Monisha Kowalski, am serving as a scribe at 4:37 PM on 11/26/2022 to document services personally performed by Emily Vogel MD based on my observations and the provider's statements to me.              Emily Vogel MD  11/26/22 3817

## 2022-11-26 NOTE — ED TRIAGE NOTES
Pt c/o feeling faint while shopping. Pt slid to floor in checkout line.  BG 170s. VSS ABC in tact.

## 2022-11-26 NOTE — ED NOTES
Bed: ED27  Expected date: 11/26/22  Expected time: 4:08 PM  Means of arrival: Ambulance  Comments:  TO TRIAGE?? BV3 37 f syncope

## 2022-11-28 LAB
ATRIAL RATE - MUSE: 82 BPM
DIASTOLIC BLOOD PRESSURE - MUSE: NORMAL MMHG
INTERPRETATION ECG - MUSE: NORMAL
P AXIS - MUSE: 41 DEGREES
PR INTERVAL - MUSE: 162 MS
QRS DURATION - MUSE: 70 MS
QT - MUSE: 374 MS
QTC - MUSE: 436 MS
R AXIS - MUSE: 12 DEGREES
SYSTOLIC BLOOD PRESSURE - MUSE: NORMAL MMHG
T AXIS - MUSE: 16 DEGREES
VENTRICULAR RATE- MUSE: 82 BPM

## 2023-01-07 ENCOUNTER — HOSPITAL ENCOUNTER (EMERGENCY)
Facility: CLINIC | Age: 38
Discharge: HOME | End: 2023-01-07
Attending: EMERGENCY MEDICINE | Admitting: EMERGENCY MEDICINE
Payer: COMMERCIAL

## 2023-01-07 VITALS
SYSTOLIC BLOOD PRESSURE: 134 MMHG | OXYGEN SATURATION: 100 % | TEMPERATURE: 97.4 F | DIASTOLIC BLOOD PRESSURE: 77 MMHG | HEART RATE: 77 BPM | RESPIRATION RATE: 21 BRPM

## 2023-01-07 DIAGNOSIS — D50.0 IRON DEFICIENCY ANEMIA DUE TO CHRONIC BLOOD LOSS: ICD-10-CM

## 2023-01-07 DIAGNOSIS — N92.0 MENORRHAGIA WITH REGULAR CYCLE: ICD-10-CM

## 2023-01-07 DIAGNOSIS — R42 LIGHTHEADEDNESS: ICD-10-CM

## 2023-01-07 LAB
ANION GAP SERPL CALCULATED.3IONS-SCNC: 12 MMOL/L (ref 7–15)
BASOPHILS # BLD AUTO: 0.1 10E3/UL (ref 0–0.2)
BASOPHILS NFR BLD AUTO: 1 %
BUN SERPL-MCNC: 8.9 MG/DL (ref 6–20)
CALCIUM SERPL-MCNC: 9.5 MG/DL (ref 8.6–10)
CHLORIDE SERPL-SCNC: 99 MMOL/L (ref 98–107)
CREAT SERPL-MCNC: 0.51 MG/DL (ref 0.51–0.95)
DEPRECATED HCO3 PLAS-SCNC: 24 MMOL/L (ref 22–29)
EOSINOPHIL # BLD AUTO: 0.1 10E3/UL (ref 0–0.7)
EOSINOPHIL NFR BLD AUTO: 1 %
ERYTHROCYTE [DISTWIDTH] IN BLOOD BY AUTOMATED COUNT: 21.7 % (ref 10–15)
GFR SERPL CREATININE-BSD FRML MDRD: >90 ML/MIN/1.73M2
GLUCOSE SERPL-MCNC: 184 MG/DL (ref 70–99)
HBA1C MFR BLD: 7.3 %
HCG SERPL QL: NEGATIVE
HCT VFR BLD AUTO: 34.6 % (ref 35–47)
HGB BLD-MCNC: 8.3 G/DL (ref 11.7–15.7)
HOLD SPECIMEN: NORMAL
IMM GRANULOCYTES # BLD: 0 10E3/UL
IMM GRANULOCYTES NFR BLD: 0 %
LYMPHOCYTES # BLD AUTO: 2.3 10E3/UL (ref 0.8–5.3)
LYMPHOCYTES NFR BLD AUTO: 30 %
MCH RBC QN AUTO: 14.7 PG (ref 26.5–33)
MCHC RBC AUTO-ENTMCNC: 24 G/DL (ref 31.5–36.5)
MCV RBC AUTO: 61 FL (ref 78–100)
MONOCYTES # BLD AUTO: 0.5 10E3/UL (ref 0–1.3)
MONOCYTES NFR BLD AUTO: 6 %
NEUTROPHILS # BLD AUTO: 4.7 10E3/UL (ref 1.6–8.3)
NEUTROPHILS NFR BLD AUTO: 62 %
NRBC # BLD AUTO: 0 10E3/UL
NRBC BLD AUTO-RTO: 0 /100
NT-PROBNP SERPL-MCNC: <36 PG/ML (ref 0–450)
PLAT MORPH BLD: NORMAL
PLATELET # BLD AUTO: 343 10E3/UL (ref 150–450)
POTASSIUM SERPL-SCNC: 3.6 MMOL/L (ref 3.4–5.3)
RBC # BLD AUTO: 5.65 10E6/UL (ref 3.8–5.2)
RBC MORPH BLD: NORMAL
SODIUM SERPL-SCNC: 135 MMOL/L (ref 136–145)
TROPONIN T SERPL HS-MCNC: 11 NG/L
TSH SERPL DL<=0.005 MIU/L-ACNC: 2.13 UIU/ML (ref 0.3–4.2)
WBC # BLD AUTO: 7.7 10E3/UL (ref 4–11)

## 2023-01-07 PROCEDURE — 84484 ASSAY OF TROPONIN QUANT: CPT | Performed by: EMERGENCY MEDICINE

## 2023-01-07 PROCEDURE — 84443 ASSAY THYROID STIM HORMONE: CPT | Performed by: EMERGENCY MEDICINE

## 2023-01-07 PROCEDURE — 83880 ASSAY OF NATRIURETIC PEPTIDE: CPT | Performed by: EMERGENCY MEDICINE

## 2023-01-07 PROCEDURE — 96360 HYDRATION IV INFUSION INIT: CPT

## 2023-01-07 PROCEDURE — 84703 CHORIONIC GONADOTROPIN ASSAY: CPT | Performed by: EMERGENCY MEDICINE

## 2023-01-07 PROCEDURE — 99284 EMERGENCY DEPT VISIT MOD MDM: CPT | Mod: 25

## 2023-01-07 PROCEDURE — 83036 HEMOGLOBIN GLYCOSYLATED A1C: CPT | Performed by: EMERGENCY MEDICINE

## 2023-01-07 PROCEDURE — 93005 ELECTROCARDIOGRAM TRACING: CPT

## 2023-01-07 PROCEDURE — 258N000003 HC RX IP 258 OP 636: Performed by: EMERGENCY MEDICINE

## 2023-01-07 PROCEDURE — 80048 BASIC METABOLIC PNL TOTAL CA: CPT | Performed by: EMERGENCY MEDICINE

## 2023-01-07 PROCEDURE — 36415 COLL VENOUS BLD VENIPUNCTURE: CPT | Performed by: EMERGENCY MEDICINE

## 2023-01-07 PROCEDURE — 85025 COMPLETE CBC W/AUTO DIFF WBC: CPT | Performed by: EMERGENCY MEDICINE

## 2023-01-07 RX ADMIN — SODIUM CHLORIDE 1000 ML: 9 INJECTION, SOLUTION INTRAVENOUS at 14:07

## 2023-01-07 ASSESSMENT — ENCOUNTER SYMPTOMS
DIZZINESS: 1
PALPITATIONS: 1
FATIGUE: 1
LIGHT-HEADEDNESS: 1

## 2023-01-07 ASSESSMENT — ACTIVITIES OF DAILY LIVING (ADL): ADLS_ACUITY_SCORE: 35

## 2023-01-07 NOTE — ED PROVIDER NOTES
Rapid Assessment Note    History:   Sheila Vazquez is a 38 year old female who presents with 2 weeks of palpitations, lightheadedness with exertion, feeling tired with lack of energy. Patient [place] the mall 12[place] but did not hit her head. LMP 2 weeks ago.  She states she has heavy periods.  No headaches, chest pain, shortness of breath, leg swelling, melena, blood in stools, N/V/D.  Has not checked a pregnancy test recently.    Exam:   General:  Alert, interactive  Cardiovascular:  Well perfused  Lungs:  No respiratory distress, no accessory muscle use  Neuro:  Moving all 4 extremities  Skin:  Warm, dry  Psych:  Normal affect    ECG normal    Plan of Care:   I evaluated the patient and developed an initial plan of care. I discussed this plan and explained that I, or one of my partners, would be returning to complete the evaluation.         I, Kari Willett MD, am serving as a scribe to document services personally performed by Kari Willett MD, based on my observations and the provider's statements to me.    1/7/2023  [employer]    Portions of this medical record were completed by a scribe. UPON MY REVIEW AND AUTHENTICATION BY ELECTRONIC SIGNATURE, this confirms (a) I performed the applicable clinical services, and (b) the record is accurate.        Kari Willett MD  01/07/23 8033

## 2023-01-07 NOTE — ED TRIAGE NOTES
Patient presents to the ED reporting fatigue, generalized weakness and palpitations with exertion. States has been having symptoms for several weeks but they are getting worse.

## 2023-01-07 NOTE — ED PROVIDER NOTES
History     Chief Complaint:  Palpitations       The history is provided by the patient.      Sheila Vazquez is a 38 year old female with a history of vasovagal syncope, arrhythmia, hypertension, anemia, and T2DM who presents to the ED with palpitations and associated lightheadedness and dizziness. She becomes dizzy frequently when she stands up, and feels increasingly and constantly fatigued as of late. She does not report a change in PO intake. She worries about her functionality due to these symptoms. She notices symptoms exacerbate with movement and they improve with rest. These symptoms have been present for weeks with a previous ED visit on 2022 following an episode of near syncope that was attributed to anemia due to dysfunctional uterine bleeding. No associated pain. Denies recent syncope.     Independent Historian: Yes     Review of External Notes: Family med 22 note, hgb 7.5     ROS:  Review of Systems   Constitutional: Positive for fatigue.   Cardiovascular: Positive for palpitations.   Neurological: Positive for dizziness and light-headedness. Negative for syncope.   All other systems reviewed and are negative.    Allergies:  The patient has no known allergies.     Medications:    Glucophage  Jardiance  Zestril  Ferrous sulfate  Lipitor  Vitamin D     Past Medical History:    Hypertension  Vasovagal syncope  Acute blood loss anemia  Chronic idiopathic constipation  Hyperlipidemia   Type II diabetes mellitus  Kidney stones  Arrhythmia    Past Surgical History:     section  Gastric bypass  Female circumcision    Family History:    Mother - hypertension, diabetes  Sister - type II diabetes, heart disease, anxiety    Social History:  The patient presents to the ED with her daughter.   PCP: [facility], [place]     Physical Exam     Patient Vitals for the past 24 hrs:   BP Temp Pulse Resp SpO2   23 1345 -- -- 85 15 100 %   23 1330 126/75 -- 87 20 100 %   23 1050  136/79 97.4  F (36.3  C) 81 16 100 %        Physical Exam  Constitutional: Alert, attentive, GCS 15  HENT:    Nose: Nose normal.    Eyes: EOM are normal, anicteric, conjugate gaze  CV: regular rate and rhythm; no murmurs  Chest: Effort normal and breath sounds clear without wheezing or rales, symmetric bilaterally   GI:  non tender. No distension. No guarding or rebound.    MSK: No LE edema, no tenderness to palpation of BLE.  Neurological: Alert, attentive, moving all extremities equally.   Skin: Skin is warm and dry.    Emergency Department Course   ECG:  ECG results from 01/07/23   EKG 12-lead, tracing only     Value    Systolic Blood Pressure     Diastolic Blood Pressure     Ventricular Rate 73    Atrial Rate 73    AR Interval 162    QRS Duration 70        QTc 387    P Axis 47    R AXIS 5    T Axis 20    Interpretation ECG      Sinus rhythm  Normal ECG  When compared with ECG of 26-NOV-2022 16:34,  QT has shortened         Imaging:  No orders to display      Report per radiology    Laboratory:  Labs Ordered and Resulted from Time of ED Arrival to Time of ED Departure   BASIC METABOLIC PANEL - Abnormal       Result Value    Sodium 135 (*)     Potassium 3.6      Chloride 99      Carbon Dioxide (CO2) 24      Anion Gap 12      Urea Nitrogen 8.9      Creatinine 0.51      Calcium 9.5      Glucose 184 (*)     GFR Estimate >90     CBC WITH PLATELETS AND DIFFERENTIAL - Abnormal    WBC Count 7.7      RBC Count 5.65 (*)     Hemoglobin 8.3 (*)     Hematocrit 34.6 (*)     MCV 61 (*)     MCH 14.7 (*)     MCHC 24.0 (*)     RDW 21.7 (*)     Platelet Count 343      % Neutrophils 62      % Lymphocytes 30      % Monocytes 6      % Eosinophils 1      % Basophils 1      % Immature Granulocytes 0      NRBCs per 100 WBC 0      Absolute Neutrophils 4.7      Absolute Lymphocytes 2.3      Absolute Monocytes 0.5      Absolute Eosinophils 0.1      Absolute Basophils 0.1      Absolute Immature Granulocytes 0.0      Absolute NRBCs 0.0      HEMOGLOBIN A1C - Abnormal    Hemoglobin A1C 7.3 (*)    TROPONIN T, HIGH SENSITIVITY - Normal    Troponin T, High Sensitivity 11     HCG QUALITATIVE PREGNANCY - Normal    hCG Serum Qualitative Negative     NT PROBNP INPATIENT - Normal    N terminal Pro BNP Inpatient <36     TSH WITH FREE T4 REFLEX - Normal    TSH 2.13     RBC AND PLATELET MORPHOLOGY    Platelet Assessment        Value: Automated Count Confirmed. Platelet morphology is normal.    RBC Morphology Confirmed RBC Indices          Emergency Department Course & Assessments:       Interventions:  Medications   0.9% sodium chloride BOLUS (has no administration in time range)        Independent Interpretation (X-rays, CTs, rhythm strip):  EKG as above     Consultations/Discussion of Management or Tests:  1353 I obtained history and examined patient as noted above.     Social Determinants of Health affecting care:  None identified    Disposition:  The patient was discharged to home.     Impression & Plan      Medical Decision Makin-year-old woman past medical history seen for menorrhagia, anemia, type 2 diabetes presenting for feelings of exertional fatigue, intermittent lightheadedness with concerned her hemoglobin is low.  She has been taking iron supplements does have ongoing heavy periods.  Her hemoglobin here is, up from prior studies at 8.3, was 7.5.  Screening EKG, troponin and BNP are all reassuring.  Pregnancy test is negative.  Her blood sugar is elevated however she has a history of type 2 diabetes, that was not readily reported to me when interviewing the patient and I did send an A1c as a precaution.  I have low suspicion for cardiac etiology, she is PERC negative.  She was given IV fluids and I recommended PCP follow-up for recheck of hemoglobin, consideration alternatives to her menorrhagia though she does not know if she is not having children yet.  Turn precautions reviewed and she was discharged.    Diagnosis:    ICD-10-CM    1.  Lightheadedness  R42       2. Menorrhagia with regular cycle  N92.0       3. Iron deficiency anemia due to chronic blood loss  D50.0              Bro Mcguire MD  [employer]  2:30 PM 01/07/23     Scribe Disclosure:  I, Stephanie Eastman, am serving as a scribe at 12:59 PM on 1/7/2023 to document services personally performed by Bro Mcguire MD based on my observations and the provider's statements to me.     1/7/2023   Bro Mcguire MD Dunbar, John Forrest, MD  01/07/23 1449

## 2023-01-07 NOTE — DISCHARGE INSTRUCTIONS
You should continue to take your medications as prescribed, I recommend staying excessively well-hydrated to see if this helps with your symptoms.  You should make an appointment to follow-up with your primary doctor for recheck.  Should you pass out, develop chest pain you should return here to the emergency department.  You do

## 2023-01-09 LAB
ATRIAL RATE - MUSE: 73 BPM
DIASTOLIC BLOOD PRESSURE - MUSE: NORMAL MMHG
INTERPRETATION ECG - MUSE: NORMAL
P AXIS - MUSE: 47 DEGREES
PR INTERVAL - MUSE: 162 MS
QRS DURATION - MUSE: 70 MS
QT - MUSE: 352 MS
QTC - MUSE: 387 MS
R AXIS - MUSE: 5 DEGREES
SYSTOLIC BLOOD PRESSURE - MUSE: NORMAL MMHG
T AXIS - MUSE: 20 DEGREES
VENTRICULAR RATE- MUSE: 73 BPM

## 2023-04-11 NOTE — ED NOTES
Pt discharged home. Verbal and written instructions given and explained. All questions answered.    
11-Apr-2023 18:24

## 2023-12-14 ENCOUNTER — MEDICAL CORRESPONDENCE (OUTPATIENT)
Dept: HEALTH INFORMATION MANAGEMENT | Facility: CLINIC | Age: 38
End: 2023-12-14
Payer: COMMERCIAL

## 2023-12-14 ENCOUNTER — TRANSFERRED RECORDS (OUTPATIENT)
Dept: HEALTH INFORMATION MANAGEMENT | Facility: CLINIC | Age: 38
End: 2023-12-14
Payer: COMMERCIAL

## 2023-12-14 LAB
ALT SERPL-CCNC: 9 U/L (ref 10–47)
AST SERPL-CCNC: 12 U/L (ref 11–38)
CHOLESTEROL (EXTERNAL): 145 MG/DL (ref 0–200)
CREATININE (EXTERNAL): 0.6 MG/DL (ref 0.7–1.4)
GFR ESTIMATED (EXTERNAL): >60 ML/MIN/1.73M2
GFR ESTIMATED (IF AFRICAN AMERICAN) (EXTERNAL): 72.6 ML/MIN/1.73M2
GLUCOSE (EXTERNAL): 261 MG/DL (ref 70–100)
HBA1C MFR BLD: 9.6 % (ref 4–7)
HDLC SERPL-MCNC: 41 MG/DL (ref 40–60)
LDL CHOLESTEROL CALCULATED (EXTERNAL): 90 MG/DL (ref 0–100)
POTASSIUM (EXTERNAL): 3.5 MMOL/L (ref 3.5–5.3)
TRIGLYCERIDES (EXTERNAL): 71 MG/DL (ref 0–150)

## 2023-12-15 ENCOUNTER — TRANSCRIBE ORDERS (OUTPATIENT)
Dept: OTHER | Age: 38
End: 2023-12-15

## 2023-12-15 DIAGNOSIS — Z98.84 HISTORY OF BARIATRIC SURGERY: Primary | ICD-10-CM

## 2023-12-18 NOTE — TELEPHONE ENCOUNTER
REFERRAL INFORMATION:  Referring Provider: Dr. Hunter  Referring Clinic: [facility]  Reason for Visit/Diagnosis: WLS, BMI 34; [place]       FUTURE VISIT INFORMATION:  Appointment Date: 2/22/2024  Appointment Time: 9 AM     NOTES RECORD STATUS  DETAILS   OFFICE NOTE from Referring Provider Received [facility]:  12/14/23 - PCC OV with Dr. Tirado   OFFICE NOTE from Other Specialists Care Everywhere [facility]:  12/19/22 - PCC OV with ANTONI Fox  11/7/22 - OBGYN OV with Dr. Ascencio  8/5/20 - GEN SUG OV with Dr. Lees   [facility] DISCHARGE SUMMARY/ ED VISITS  Care Everywhere Religious:  11/19/22 - ED OV with Dr. Nicolas   OPERATIVE REPORT N/A 2021 - Bypass in [facility]   PERTINENT LABS Care Everywhere / Internal

## 2024-02-15 ENCOUNTER — TRANSCRIBE ORDERS (OUTPATIENT)
Dept: URGENT CARE | Facility: URGENT CARE | Age: 39
End: 2024-02-15
Payer: COMMERCIAL

## 2024-02-15 DIAGNOSIS — M54.16 LUMBAR BACK PAIN WITH RADICULOPATHY AFFECTING LOWER EXTREMITY: Primary | ICD-10-CM

## 2024-02-22 ENCOUNTER — PRE VISIT (OUTPATIENT)
Dept: ENDOCRINOLOGY | Facility: CLINIC | Age: 39
End: 2024-02-22

## 2024-02-22 ENCOUNTER — TELEPHONE (OUTPATIENT)
Dept: ENDOCRINOLOGY | Facility: CLINIC | Age: 39
End: 2024-02-22

## 2024-02-22 ENCOUNTER — LAB (OUTPATIENT)
Dept: LAB | Facility: CLINIC | Age: 39
End: 2024-02-22
Payer: COMMERCIAL

## 2024-02-22 ENCOUNTER — TELEPHONE (OUTPATIENT)
Dept: GASTROENTEROLOGY | Facility: CLINIC | Age: 39
End: 2024-02-22

## 2024-02-22 ENCOUNTER — OFFICE VISIT (OUTPATIENT)
Dept: ENDOCRINOLOGY | Facility: CLINIC | Age: 39
End: 2024-02-22
Payer: COMMERCIAL

## 2024-02-22 VITALS
WEIGHT: 203.6 LBS | SYSTOLIC BLOOD PRESSURE: 105 MMHG | HEART RATE: 88 BPM | BODY MASS INDEX: 33.92 KG/M2 | OXYGEN SATURATION: 100 % | HEIGHT: 65 IN | DIASTOLIC BLOOD PRESSURE: 73 MMHG

## 2024-02-22 DIAGNOSIS — E11.9 TYPE 2 DIABETES MELLITUS WITHOUT COMPLICATION, WITHOUT LONG-TERM CURRENT USE OF INSULIN (H): Chronic | ICD-10-CM

## 2024-02-22 DIAGNOSIS — D64.9 ANEMIA, UNSPECIFIED TYPE: ICD-10-CM

## 2024-02-22 DIAGNOSIS — Z90.3 S/P GASTRIC SLEEVE PROCEDURE: ICD-10-CM

## 2024-02-22 DIAGNOSIS — D50.0 IRON DEFICIENCY ANEMIA DUE TO CHRONIC BLOOD LOSS: ICD-10-CM

## 2024-02-22 DIAGNOSIS — E66.811 CLASS 1 OBESITY WITH SERIOUS COMORBIDITY AND BODY MASS INDEX (BMI) OF 33.0 TO 33.9 IN ADULT, UNSPECIFIED OBESITY TYPE: ICD-10-CM

## 2024-02-22 DIAGNOSIS — K59.04 CHRONIC IDIOPATHIC CONSTIPATION: ICD-10-CM

## 2024-02-22 DIAGNOSIS — K21.9 GASTROESOPHAGEAL REFLUX DISEASE, UNSPECIFIED WHETHER ESOPHAGITIS PRESENT: ICD-10-CM

## 2024-02-22 PROBLEM — E78.5 HLD (HYPERLIPIDEMIA): Status: ACTIVE | Noted: 2017-09-22

## 2024-02-22 PROBLEM — R87.810 CERVICAL HIGH RISK HPV (HUMAN PAPILLOMAVIRUS) TEST POSITIVE: Status: ACTIVE | Noted: 2020-05-28

## 2024-02-22 PROBLEM — Z98.84 S/P GASTRIC BYPASS: Status: ACTIVE | Noted: 2022-06-01

## 2024-02-22 PROBLEM — H11.023: Status: RESOLVED | Noted: 2017-03-13 | Resolved: 2024-02-22

## 2024-02-22 PROBLEM — I10 HTN (HYPERTENSION): Status: RESOLVED | Noted: 2017-09-22 | Resolved: 2024-02-22

## 2024-02-22 PROBLEM — N97.0 FEMALE INFERTILITY ASSOCIATED WITH ANOVULATION: Status: RESOLVED | Noted: 2022-11-07 | Resolved: 2024-02-22

## 2024-02-22 PROBLEM — D62 ACUTE BLOOD LOSS ANEMIA: Status: RESOLVED | Noted: 2017-09-09 | Resolved: 2024-02-22

## 2024-02-22 PROBLEM — R55 VASOVAGAL SYNCOPE: Status: ACTIVE | Noted: 2017-09-09

## 2024-02-22 PROBLEM — K76.0 FATTY LIVER: Status: ACTIVE | Noted: 2024-02-22

## 2024-02-22 LAB
ALBUMIN SERPL BCG-MCNC: 4.2 G/DL (ref 3.5–5.2)
ALP SERPL-CCNC: 73 U/L (ref 40–150)
ALT SERPL W P-5'-P-CCNC: <5 U/L (ref 0–50)
ANION GAP SERPL CALCULATED.3IONS-SCNC: 10 MMOL/L (ref 7–15)
AST SERPL W P-5'-P-CCNC: 9 U/L (ref 0–45)
BILIRUB SERPL-MCNC: 0.2 MG/DL
BUN SERPL-MCNC: 9.4 MG/DL (ref 6–20)
CALCIUM SERPL-MCNC: 9.2 MG/DL (ref 8.6–10)
CHLORIDE SERPL-SCNC: 104 MMOL/L (ref 98–107)
CHOLEST SERPL-MCNC: 168 MG/DL
CREAT SERPL-MCNC: 0.59 MG/DL (ref 0.51–0.95)
DEPRECATED HCO3 PLAS-SCNC: 24 MMOL/L (ref 22–29)
EGFRCR SERPLBLD CKD-EPI 2021: >90 ML/MIN/1.73M2
ERYTHROCYTE [DISTWIDTH] IN BLOOD BY AUTOMATED COUNT: 19.8 % (ref 10–15)
FASTING STATUS PATIENT QL REPORTED: NO
FERRITIN SERPL-MCNC: 5 NG/ML (ref 6–175)
GLUCOSE SERPL-MCNC: 85 MG/DL (ref 70–99)
HBA1C MFR BLD: 6.6 %
HCT VFR BLD AUTO: 32.4 % (ref 35–47)
HDLC SERPL-MCNC: 40 MG/DL
HGB BLD-MCNC: 8.6 G/DL (ref 11.7–15.7)
IRON BINDING CAPACITY (ROCHE): 397 UG/DL (ref 240–430)
IRON SATN MFR SERPL: 3 % (ref 15–46)
IRON SERPL-MCNC: 13 UG/DL (ref 37–145)
LDLC SERPL CALC-MCNC: 112 MG/DL
MCH RBC QN AUTO: 16 PG (ref 26.5–33)
MCHC RBC AUTO-ENTMCNC: 26.5 G/DL (ref 31.5–36.5)
MCV RBC AUTO: 60 FL (ref 78–100)
NONHDLC SERPL-MCNC: 128 MG/DL
PLATELET # BLD AUTO: 445 10E3/UL (ref 150–450)
POTASSIUM SERPL-SCNC: 4.1 MMOL/L (ref 3.4–5.3)
PROT SERPL-MCNC: 7.5 G/DL (ref 6.4–8.3)
PTH-INTACT SERPL-MCNC: 63 PG/ML (ref 15–65)
RBC # BLD AUTO: 5.37 10E6/UL (ref 3.8–5.2)
SODIUM SERPL-SCNC: 138 MMOL/L (ref 135–145)
TRIGL SERPL-MCNC: 79 MG/DL
VIT B12 SERPL-MCNC: 400 PG/ML (ref 232–1245)
VIT D+METAB SERPL-MCNC: 28 NG/ML (ref 20–50)
WBC # BLD AUTO: 5.4 10E3/UL (ref 4–11)

## 2024-02-22 PROCEDURE — 83970 ASSAY OF PARATHORMONE: CPT | Performed by: PATHOLOGY

## 2024-02-22 PROCEDURE — 83036 HEMOGLOBIN GLYCOSYLATED A1C: CPT

## 2024-02-22 PROCEDURE — 99205 OFFICE O/P NEW HI 60 MIN: CPT

## 2024-02-22 PROCEDURE — 82306 VITAMIN D 25 HYDROXY: CPT

## 2024-02-22 PROCEDURE — 80061 LIPID PANEL: CPT | Performed by: PATHOLOGY

## 2024-02-22 PROCEDURE — 84590 ASSAY OF VITAMIN A: CPT | Mod: 90 | Performed by: PATHOLOGY

## 2024-02-22 PROCEDURE — 99000 SPECIMEN HANDLING OFFICE-LAB: CPT | Performed by: PATHOLOGY

## 2024-02-22 PROCEDURE — 82607 VITAMIN B-12: CPT

## 2024-02-22 PROCEDURE — 83540 ASSAY OF IRON: CPT | Performed by: PATHOLOGY

## 2024-02-22 PROCEDURE — 82728 ASSAY OF FERRITIN: CPT | Performed by: PATHOLOGY

## 2024-02-22 PROCEDURE — 85027 COMPLETE CBC AUTOMATED: CPT | Performed by: PATHOLOGY

## 2024-02-22 PROCEDURE — 80053 COMPREHEN METABOLIC PANEL: CPT | Performed by: PATHOLOGY

## 2024-02-22 PROCEDURE — 83550 IRON BINDING TEST: CPT | Performed by: PATHOLOGY

## 2024-02-22 PROCEDURE — 36415 COLL VENOUS BLD VENIPUNCTURE: CPT | Performed by: PATHOLOGY

## 2024-02-22 PROCEDURE — 99417 PROLNG OP E/M EACH 15 MIN: CPT

## 2024-02-22 RX ORDER — BLOOD-GLUCOSE METER
EACH MISCELLANEOUS
COMMUNITY
Start: 2024-02-17

## 2024-02-22 RX ORDER — MEPERIDINE HYDROCHLORIDE 25 MG/ML
25 INJECTION INTRAMUSCULAR; INTRAVENOUS; SUBCUTANEOUS EVERY 30 MIN PRN
Status: CANCELLED | OUTPATIENT
Start: 2024-02-22

## 2024-02-22 RX ORDER — DIPHENHYDRAMINE HYDROCHLORIDE 50 MG/ML
50 INJECTION INTRAMUSCULAR; INTRAVENOUS
Status: CANCELLED
Start: 2024-02-22

## 2024-02-22 RX ORDER — EPINEPHRINE 1 MG/ML
0.3 INJECTION, SOLUTION, CONCENTRATE INTRAVENOUS EVERY 5 MIN PRN
Status: CANCELLED | OUTPATIENT
Start: 2024-02-22

## 2024-02-22 RX ORDER — ALBUTEROL SULFATE 0.83 MG/ML
2.5 SOLUTION RESPIRATORY (INHALATION)
Status: CANCELLED | OUTPATIENT
Start: 2024-02-22

## 2024-02-22 RX ORDER — METFORMIN HCL 500 MG
500 TABLET, EXTENDED RELEASE 24 HR ORAL
COMMUNITY
Start: 2022-06-15

## 2024-02-22 RX ORDER — PREDNISOLONE ACETATE 10 MG/ML
SUSPENSION/ DROPS OPHTHALMIC
COMMUNITY
Start: 2023-10-25

## 2024-02-22 RX ORDER — EXENATIDE 2 MG/.85ML
INJECTION, SUSPENSION, EXTENDED RELEASE SUBCUTANEOUS
COMMUNITY
Start: 2024-02-05 | End: 2024-09-10

## 2024-02-22 RX ORDER — HEPARIN SODIUM (PORCINE) LOCK FLUSH IV SOLN 100 UNIT/ML 100 UNIT/ML
5 SOLUTION INTRAVENOUS
Status: CANCELLED | OUTPATIENT
Start: 2024-02-22

## 2024-02-22 RX ORDER — MULTIVIT-MIN/IRON/FOLIC ACID/K 45-800-120
1 CAPSULE ORAL 2 TIMES DAILY
Qty: 180 CAPSULE | Refills: 3 | Status: SHIPPED | OUTPATIENT
Start: 2024-02-22

## 2024-02-22 RX ORDER — HEPARIN SODIUM,PORCINE 10 UNIT/ML
5-20 VIAL (ML) INTRAVENOUS DAILY PRN
Status: CANCELLED | OUTPATIENT
Start: 2024-02-22

## 2024-02-22 RX ORDER — METHYLPREDNISOLONE SODIUM SUCCINATE 125 MG/2ML
125 INJECTION, POWDER, LYOPHILIZED, FOR SOLUTION INTRAMUSCULAR; INTRAVENOUS
Status: CANCELLED
Start: 2024-02-22

## 2024-02-22 RX ORDER — POLYETHYLENE GLYCOL 3350 17 G/17G
1 POWDER, FOR SOLUTION ORAL DAILY
Qty: 578 G | Refills: 3 | Status: SHIPPED | OUTPATIENT
Start: 2024-02-22

## 2024-02-22 RX ORDER — ALBUTEROL SULFATE 90 UG/1
1-2 AEROSOL, METERED RESPIRATORY (INHALATION)
Status: CANCELLED
Start: 2024-02-22

## 2024-02-22 ASSESSMENT — PAIN SCALES - GENERAL: PAINLEVEL: SEVERE PAIN (6)

## 2024-02-22 NOTE — NURSING NOTE
"(   Chief Complaint   Patient presents with    New Patient     New re-establish    )    ( Weight: 92.4 kg (203 lb 9.6 oz) )  ( Height: 165.1 cm (5' 5\") )  ( BMI (Calculated): 33.88 )  (   )  ( Cumulative weight loss (lbs): 0 )  (   )  (   )  (   )  (   )    ( BP: 105/73 )  (   )  (   )  (   )  ( Pulse: 88 )  (   )  ( SpO2: 100 % )    ( There is no problem list on file for this patient.   )  (   Current Outpatient Medications   Medication Sig Dispense Refill    BYDUREON BCISE 2 MG/0.85ML auto-injector INJECT 2 MG SUBCUTANEOUSLY ONCE WEEKLY      ferrous gluconate (FERGON) 324 (38 Fe) MG tablet Take 1 tablet (324 mg) by mouth 2 times daily (with meals) 60 tablet 0    metFORMIN (GLUCOPHAGE XR) 500 MG 24 hr tablet Take 500 mg by mouth      vitamin D3 (CHOLECALCIFEROL) 1.25 MG (69084 UT) capsule Take 50,000 Units by mouth      ibuprofen (ADVIL/MOTRIN) 200 MG tablet Take 2 tablets (400 mg) by mouth every 8 hours as needed for pain (Patient not taking: Reported on 2/22/2024) 60 tablet 0    )  ( Diabetes Eval:    )    ( Pain Eval:  Severe Pain (6) )    ( Wound Eval:       )    (   History   Smoking Status    Never   Smokeless Tobacco    Never    )    ( Signed By:  Steffen Alatorre, EMT; February 22, 2024; 8:36 AM )    "

## 2024-02-22 NOTE — LETTER
2024       RE: Sheila Vazquez  [address]  [place] [address]     Dear Colleague,    Thank you for referring your patient, Sheila Vazquez, to the [facility] [place] [facility]. Please see a copy of my visit note below.    Return Bariatric Surgery Note    RE: Sheila Vazquez  MR#: [id number]  : 1985  VISIT DATE: 2024      Dear Clinic, [facility] [place],    I had the pleasure of seeing your patient, Sheila Vazquez, in my post-bariatric surgery assessment clinic.    Assessment & Plan  Problem List Items Addressed This Visit       Chronic idiopathic constipation    Relevant Medications    polyethylene glycol (MIRALAX) 17 GM/Dose powder    Type 2 diabetes mellitus without complication, without long-term current use of insulin (H) (Chronic)     Currently taking Bydureon and Metformin. Has been on both of these medications for the past 2 months. No side effects. Has not been helpful weight weight loss. Checks BS manually 2xdaily. Average fasting 130-150. Will transition to Ozempic. No contraindications. Discussed side effects, risks, and benefits. No hx of pancreatitis. No personal or family hx of MTC or MENII.          Relevant Medications    metFORMIN (GLUCOPHAGE XR) 500 MG 24 hr tablet    BYDUREON BCISE 2 MG/0.85ML auto-injector    semaglutide (OZEMPIC) 2 MG/3ML pen    Other Relevant Orders    Comprehensive metabolic panel (Completed)    Hemoglobin A1c (Completed)    Med Therapy Management Referral    S/P gastric sleeve procedure     S/p sleeve in  in [facility]. She is not positive on what bariatric surgery she had, but was told by the surgeon that 80% of her stomach was removed and denies any intestine changes.     Weight prior to surgery - 118kg - 260lb  Nikolay weight - 80kg - 176lb    Established care today for concerns of weight regain (30lbs in the past 2 years) and new onset abdominal pain and  dysphagia.     She has had epigastric abdominal pain for the past 6-9months that occurs after eating any time. Also has abdominal pain with hunger. Pain will last around 3 hours, and will improve with laying down at times. Denies vomiting or blood in stool. Does have dark stools, but is currently on an iron supplement. Will start omeprazole today and EGD ordered.     Dysphagia symptoms with red meat and chicken that was previously tolerated over the past 6 months. Cannot vomit when tries. Will relieve with rest after a few hours. Will get EGD to further evaluate.          Relevant Medications    Multiple Vitamins-Minerals (BARIATRIC MULTIVITAMINS/IRON) CAPS    Other Relevant Orders    Vitamin A    Vitamin B12 (Completed)    Vitamin D Deficiency (Completed)    Class 1 obesity with serious comorbidity and body mass index (BMI) of 33.0 to 33.9 in adult, unspecified obesity type    Relevant Medications    metFORMIN (GLUCOPHAGE XR) 500 MG 24 hr tablet    BYDUREON BCISE 2 MG/0.85ML auto-injector    semaglutide (OZEMPIC) 2 MG/3ML pen    Other Relevant Orders    Lipid panel reflex to direct LDL Fasting (Completed)    Parathyroid Hormone Intact (Completed)    Gastroesophageal reflux disease, unspecified whether esophagitis present    Relevant Medications    polyethylene glycol (MIRALAX) 17 GM/Dose powder    omeprazole (PRILOSEC) 20 MG DR capsule    Other Relevant Orders    Adult GI  Referral - Procedure Only    Iron deficiency anemia due to chronic blood loss - Primary    Relevant Orders    CBC with platelets    Ferritin    Iron and iron binding capacity     Other Visit Diagnoses       Anemia, unspecified type        Relevant Orders    CBC with platelets (Completed)    Ferritin (Completed)    Iron and iron binding capacity (Completed)           Start Ozempic 0.25mg once weekly for 4 weeks, then increase to 0.5mg once weekly. If approved, stop Bydrueon 1 week prior to starting Ozempic  Start Multivitamin daily    Anemia - iron infusion ordered. Most likely due to heavy menstrual cycles. Continue iron supplement. Will get repeat iron labs in 1 month.   GERD - start omeprazole 20mg daily   Constipation - start miralax 1 cap daily   EGD ordered for symptoms of dysphagia and abdominal pain  MTM Pharmacist in 4 weeks   Candace Harrington in 3 months     116 minutes spent by me on the date of the encounter doing chart review, history and exam, documentation and further activities per the note    CHIEF COMPLAINT: Post-bariatric surgery follow-up.     HISTORY OF PRESENT ILLNESS:      2/22/2024     8:28 AM   Questions Regarding Prior Weight Loss Surgery Reviewed With Patient   I had the following weight loss procedure Sleeve Gastrectomy   What year was your surgery? 2021   How has your weight changed since your last visit? I have gained weight   Do you currently have any of the following Diabetes    Heartburn, acid reflux, or GERD (acid reflux disease)?    Hyperlipidemia (high cholesterol, triglycerides)?   Do you have any concerns today? no current concerns     S/p gastric sleeve in 2021 in [facility]. Is not 100% positive, but surgeon told her he removed 80% of stomach  Weight prior to surgery - 118kg - 260lb  Josemanuel weight - 80kg - 176lb    Overweight onset as early adult. Weight gain through 5 pregnancies, 6 children (1 set of twins). Previously weight stable around 140lbs.     Was able to maintain josemanuel weight for around 1 year. Weight regain over the past 2 years. Weight gain over was gradul, but feels like it was has been faster recently. Weight is not stable. Has tried to lose weight through increase exercise and low calorie diet. Wants to lose weight to help overall health.       Anti-obesity medications:   Bydureon - has been on this for 2 months for Type II diabetes. No side effects. Has not been helpful with weight loss.   Metformin 2000mg - has been on this for 2 months for type II diabetes. No side effects. No effect on weight.      Recent diet changes: Eating 2 meals a day, will have 2 snacks. Portion sizes around 1 cup. Can no longer eat chicken or red meat, or will have dysphagia symptoms. Sometimes will have abdominal pain with pasta, bread, and rice. Abdominal pain after every meal. No increase hunger. Increase thoughts of food. Can get full and stay full. Craves sweets. Cooks all meals at home. No emotional or boredom eating.     -Protein? Protein focused  -Water? 48oz of water daily. Craves ice - for the past 4 months. Drinks coffee +cream (1 cup)    Recent exercise/activity changes: Very active around the house and with children. Previously went to the gym, but cannot currently due to back pain.     Vitamins/Labs: Vitamin D, Iron    No nausea, vomiting, diarrhea  Denies Dumping syndrome     Abdominal pain - epigastric pain, described as sharp, stabbing, squeezing pain. Will have pain after every time she eats and when she is hungry. Pain with hunger since surgery. Started over the past 6-9 months. Pain will last around 3 hours, and will eventually go away. Will lay down, and will help at times. Does not radiate.     Dysphagia - with any meat. Will try to throw up, but cannot. Will eventually pass. This has been going on for the past 6 months. Previously was able to eat meat in small pieces.     GERD - symptoms of burning in chest. Only when eats something oily. Does not take anything for it. Since surgery.     Constipation - has a BM every 2-3 days. If does not have a BM in 2 days will have tea and multigrain with relief - will do this 2xweek. Stools are hard. Has to push and strain. Will have black stools at times - but does take iron daily. No hematochezia.     Hypoglycemia - will feel light headed after eating wheat products. But this was before surgery even. Has passed out (unsure for how long), but not in the past 2 years.     Weight History:      2/22/2024     8:28 AM   --   What is your highest lifetime weight? 245   What is  your lowest weight since surgery? (In pounds) 171.9     Initial Weight (lbs): 203.6 lbs  Weight: 92.4 kg (203 lb 9.6 oz)     Cumulative weight loss (lbs): 0  Weight Loss Percentage: 0%        2/22/2024     8:28 AM   Questions Regarding Co-Morbidities and Health Concerns Reviewed With Patient   Pre-diabetes Never   Diabetes II Stayed the same   High Blood Pressure Never   High cholesterol Stayed the same   Heartburn/Reflux Stayed the same   Sleep apnea Never   PCOS Stayed the same   Back pain Stayed the same   Joint pain Stayed the same   Lower leg swelling Improved     CLAY - Has been on iron supplement for the past 2 months. Has very heavy menstrual cycles. Has needed iron infusions in the past for both menstrual cycle bleeding and in pregnancy. Last one in 2021.     Type II diabetes - taking metformin and bydureon. Checks BS daily. Fasting BS - 130-150, Post prandial BS - 150-200. Followed by PCP     HLD - not on mediations. Followed by PCP         2/22/2024     8:28 AM   Eating Habits   How many meals do you eat per day? 3   Do you snack between meals? No   How much food are you eating at each meal? 1/2 cup to 1 cup   Are you able to separate your meals and liquids by at least 30 minutes? No   Are you able to avoid liquid calories? No           2/22/2024     8:28 AM   Exercise Questions Reviewed With Patient   How often do you exercise? Less than 1 time per week   What is the duration of your exercise (in minutes)? I do not exercise.   What types of exercise do you do? I don't exercise   What keeps you from being more active? Lack of Time       Social History:      2/22/2024     8:28 AM   --   Are you smoking? No   Are you drinking alcohol? No     Stay at home. Supportive  with 6 children. Good support system.     No drugs or THC.     Medications:  Current Outpatient Medications   Medication    Blood Glucose Monitoring Suppl (ACCU-CHEK GUIDE) w/Device KIT    BYDERIC BCISE 2 MG/0.85ML auto-injector     "ferrous gluconate (FERGON) 324 (38 Fe) MG tablet    metFORMIN (GLUCOPHAGE XR) 500 MG 24 hr tablet    Multiple Vitamins-Minerals (BARIATRIC MULTIVITAMINS/IRON) CAPS    omeprazole (PRILOSEC) 20 MG DR capsule    polyethylene glycol (MIRALAX) 17 GM/Dose powder    prednisoLONE acetate (PRED FORTE) 1 % ophthalmic suspension    semaglutide (OZEMPIC) 2 MG/3ML pen    vitamin D3 (CHOLECALCIFEROL) 1.25 MG (87099 UT) capsule     No current facility-administered medications for this visit.         2/22/2024     8:28 AM   --   Do you avoid NSAIDs such as (Ibuprofen, Aleve, Naproxen, Advil)? No     Only takes tylenol.     ROS:  GI:       2/22/2024     8:28 AM   --   Vomiting Yes   Diarrhea No   Constipation No   Swallowing trouble No   Abdominal pain Yes   Heartburn No     Skin:       2/22/2024     8:28 AM   BAR RBS ROS - SKIN   Rash in skin folds No     Psych:       2/22/2024     8:28 AM   --   Depression No   Anxiety No     Female Only:       2/22/2024     8:28 AM   BAR RBS ROS -    Female only Excessive menstrual bleeding    Regular menstrual cycles   Stress urinary incontinence No     Anti-obesity medication ROS:    HEENT  Hx of glaucoma: No    Cardiovascular  CAD:No  HTN:Yes - only when pregnant     Gastrointestinal  GERD:Yes  Constipation:Yes  Liver Dz:Yes - fatty liver  H/O Pancreatitis:No    Psychiatric  Bipolar: No  Anxiety:No  Depression:No  History of alcohol/drug abuse: No  Hx of eating disorder:No    Endocrine  Personal or family hx of MTC or MEN2:No  Diabetes/prediabetes: Yes    Neurologic:  Hx of seizures: No  Hx of migraines: No  Memory Impairment: No      History of kidney stones: Yes - was seen on CT, but has not passed.   Kidney disease: No  Current birth control:  no, but is not sexually active    Taking Opioid/Narcotic: No        Objective   /73 (BP Location: Left arm, Patient Position: Sitting, Cuff Size: Adult Large)   Pulse 88   Ht 1.651 m (5' 5\")   Wt 92.4 kg (203 lb 9.6 oz)   SpO2 100%   " BMI 33.88 kg/m      Physical Exam   GENERAL: alert and no distress  EYES: Eyes grossly normal to inspection.  No discharge or erythema, or obvious scleral/conjunctival abnormalities.  RESP: No audible wheeze, cough, or visible cyanosis.    SKIN: Visible skin clear. No significant rash, abnormal pigmentation or lesions.  NEURO: Cranial nerves grossly intact.  Mentation and speech appropriate for age.  PSYCH: Appropriate affect, tone, and pace of words          Sincerely,    Candace Rodriguez PA-C

## 2024-02-22 NOTE — PATIENT INSTRUCTIONS
"Lalo Sosa, it was nice to meet you today!  Thank you for allowing us the privilege of caring for you. We hope we provided you with the excellent service you deserve.   Please let us know if there is anything else we can do for you so that we can be sure you are completely satisfied with your care experience.    To ensure the quality of our services you may be receiving a patient satisfaction survey from an independent patient satisfaction monitoring company.    The greatest compliment you can give is a \"Likely to Recommend\"    Your visit was with Candace Harrington FLOR Rodriguez today.    Instructions per today's visit:     Start Ozempic 0.25mg once weekly for 4 weeks, then increase to 0.5mg once weekly. Stop Bydureon one week prior to starting Ozempic.   Bariatric labs ordered, to be collected today. Will reach out with results and any other vitamin adjustments needed   Start Multivitamin daily   Constipation - start Miralax 1 cap daily.   Heartburn - start Omeprazole 20mg once daily.   Anemia - iron infusion ordered. Most likely due to heavy menstrual cycles. Continue iron supplement. Will get repeat iron labs in 1 month.   Abdominal pain and trouble swallowing - Endoscopy ordered, to be done by Dr. Lee Juan. Call the Endoscopy Department at [phone] to schedule an endoscopy  [facility] Pharmacist in 4 weeks - someone will call you to schedule this.   Candace Harrington in 3 months     ___________________________________________________________________________  Important contact and scheduling information:  Please call our contact center at [phone] to schedule your next appointments.  For any nursing questions or concerns call Binta Aguero LPN at [phone] or Milagros Perez RN at [phone]  Please call during clinic hours Monday through Friday 8:00a - 4:00p if you have questions or you can contact us via [employer] at anytime and we will reply during clinic hours.    Lab results will be communicated through My Chart or " "letter (if My Chart not used). Please call the clinic if you have not received communication after 1 week or if you have any questions.?  Clinic Fax: [phone]  __________________________________________________________________________    If labs were ordered today:    Please make an appointment to have them drawn at your convenience.     To schedule the Lab Appointment using [employer]:  Select \"Schedule an Appointment\"  Select \"Lab Only\"  For \"A couple of questions\", select \"Other\"  For \"Which locations work for you?, select the location and set up the appointment    To schedule by phone call [phone] to schedule a lab only appointment at any [facility] lab.  ___________________________________________________________________________  Work with [facility] !  Virtual Sessions are Available through [facility] Weight Management Clinics    To learn more, call to schedule a free, Health  Q&A appointment: [phone]     What is Health Coaching?  Do you know what you are supposed to do, but you just aren't doing it?  Then, HEALTH COACHING may help you!   Get unstuck and move forward with the support of a professionally trained NBC-HWC (National Board-Certified Health and ) who uses evidence-based approaches to help you move forward with healthy lifestyle changes in the areas of weight loss, stress management and overall well-being.    Health Coaches help you identify goals that will work best for you. Health Coaches provide support and encouragement with overcoming barriers and help you to find inspiration and motivation to lead a healthy lifestyle.    Option one:  Health Coaching 3-Pack; Three, 30-minute Health Coaching Visits, for $99  Visits are done virtually (phone or video)  This is a self pay service; we do not accept insurance for shaun coaching.    Option two:   The 24 week Plan; 11 Health Coaching Visits, and a 7 months subscription to [employer]-- on-demand fitness, " nutrition and mindfulness classes, for $499 (employee discounts may be available). Participants will also meet regularly with a weight management Medical Provider and a Registered/Licensed Dietician.  This is a self-pay service; we do not accept insurance for health coaching.    To Schedule a free Health  Q&A appointment to learn more,  call [phone].  ____________________________________________________________________  [facility]  Healthy Lifestyle Group    Healthy Lifestyle Group  This is a 60 minute virtual coaching group for those who want to lead a healthier lifestyle. Come together to set goals and overcome barriers in a supportive group environment. We will address the four pillars of health--nutrition, exercise, sleep and emotional well-being.  This group is highly recommended for those who are participating in the 24 week Healthy Lifestyle Plan and our Health Coaching sessions.    WHEN: This group meets the first Friday of the month, 12:30 PM - 1:30 PM online, via a zoom meeting.      FACILITATOR: Led by National Board Certified Health and , Maribel Jerez [facility].    TO REGISTER: Please call the Call Center at [phone] to register. You will get an appointment to attend in [employer]. Fifteen minutes prior to the meeting, complete the e-check in and you will get the link to join the meeting.  There is no charge to attend this group and space is limited.      2023 and 2024 Meeting Topics and Dates:    November 3: Introduction to Mindfulness (Learn simple and effective mindfulness practices and how it can benefit you)    December 8: Let's Talk (guided discussion on our wins and challenges)    January 5: New Years Vision: Manifest your Best 2024! (Guided imagery,  journaling and discussion)    February 2: Let's Talk    March 1: 10 Percent Happier by Carrillo Decker (Book Bites; a guided discussion on the nuggets of wisdom from favorite wellness  "books; no need to read the book but highly encouraged)    April 5: Let's Talk    May 3: \"Essentialism; The Disciplined Pursuit of Less by Kevan Montemayor (book bites discussion)    June 7: Let's Talk    July 5: NO MEETING, off for the 4th of July Holiday    August 2: The Blue Zones, Secrets for Living a Longer Life by Carrillo Berg (book bites discussion)      If you would like bariatric surgery specific support group info please let your care team know.         Thank you,   [facility] Comprehensive Weight Management Team      OZEMPIC (semaglutide)    We are starting a GLP-1 (Glucagon-like Peptide-1) medication called semaglutide (aka Ozempic). One of the ways it works is by slowing down the rate that food leaves your stomach. You feel stoll and will eat less. It also helps regulate hormones that can help improve your blood sugars and weight.     Ozempic has been studied for safety and effect on weight loss in adults without diabetes. It is currently FDA approved for diabetes and is considered \"off label\" use for weight loss.    Dosing for this medication:   Month 1- Inject 0.25 mg weekly  Month 2- Inject 0.5mg weekly    Side effects of GLP- Medications include: The most common side effects are all GI related and consist of: nausea, constipation, diarrhea, burping, or gassiness. Patients are advised to eat slowly and less, and nausea typically passes if people can stick it out.     The risk of pancreatitis (inflammation of the pancreas) has been associated with this type of medication, but is very rare.  If you have had pancreatitis in the past, this medication may not be for you. Please let us know about any past history of pancreas problems.    Symptoms of pancreatitis include: Pain in your upper stomach area which may travel to your back and be worse after eating. Your stomach area may be tender to the touch.  You may have vomiting or nausea and/or have a fever. If you should develop any of these symptoms, " stop the medication and contact your primary care doctor. They will do a blood test to check for pancreatitis.       Ozempic should be discontinued for ?2 months prior to becoming pregnant.       There is a small chance you may have some low blood sugar after taking the medication.   The signs of low blood sugar are:  Weakness  Shaky   Hungry  Sweating  Confusion      See below for ways to treat low blood sugar without adding in lots of extra calories.      Treating Low Blood Sugar    If you have symptoms of low blood sugar (sweating, shaking, dizzy, confused) eat 15 grams of carbs and wait 15 minutes:    Glucose Tabs are best for sugars under 70 -  Dex4 or BD Glucose tablets are good, you will need to take 3-4 of these to equal 15 grams.     One small box of raisins  4 oz fruit juice box or   cup fruit juice  1 small apple  1 small banana    cup canned fruit in water    English muffin or a slice of bread with jelly   1 low fat frozen waffle with sugar-free syrup    cup cottage cheese with   cup frozen or fresh blueberries  1 cup skim or low-fat milk    cup whole grain cereal  4-6 crackers such as Triscuits      This medication is usually not covered by insurance and can be quite expensive. Sometimes a prior authorization is required, which may take up to 1-2 weeks for an insurance company to make a decision if they will cover the medication. Please be patient, you will be notified after a decision has been made.    For any questions or concerns please send a [employer] message to our team or call our weight management call center at [phone] during regular business hours. For questions during evenings or weekends your messages will be addressed during the next business day.  For emergencies please call 911 or seek immediate medical care.      (Do not stop taking it if you don't think it's working. For some people it works without them knowing it.)     In order to get refills of this or any medication we prescribe  you must be seen in the medical weight mgmt clinic every 2-4 months. Please have your pharmacy fax a refill request to [phone].

## 2024-02-22 NOTE — TELEPHONE ENCOUNTER
PA Initiation    Medication: OZEMPIC (0.25 OR 0.5 MG/DOSE) 2 MG/3ML SC SOPN  Insurance Company: [employer] - Phone [phone] Fax [phone]  Pharmacy Filling the Rx:    Filling Pharmacy Phone:    Filling Pharmacy Fax:    Start Date: 2/22/2024    Key:[id number]

## 2024-02-22 NOTE — Clinical Note
Hi,   Pt is s/p gastric [place] She is having abdominal pain and dysphagia. She is leaving for [facility] back at [place] [facility] [place] on 3/14. She was hoping to have an EGD done before then. Do we know if that would be possible? It looks like Dr. Quispe soonest was 3/14. However, if Dr. Bryan has anything sooner that would work too.   Thank you,  MK

## 2024-02-22 NOTE — PROGRESS NOTES
Return Bariatric Surgery Note    RE: Sheila Vazquez  MR#: [id number]  : 1985  VISIT DATE: 2024      Dear Clinic, [facility],    I had the pleasure of seeing your patient, Sheila Vazquez, in my post-bariatric surgery assessment clinic.    Assessment & Plan   Problem List Items Addressed This Visit       Chronic idiopathic constipation    Relevant Medications    polyethylene glycol (MIRALAX) 17 GM/Dose powder    Type 2 diabetes mellitus without complication, without long-term current use of insulin (H) (Chronic)     Currently taking Bydureon and Metformin. Has been on both of these medications for the past 2 months. No side effects. Has not been helpful weight weight loss. Checks BS manually 2xdaily. Average fasting 130-150. Will transition to Ozempic. No contraindications. Discussed side effects, risks, and benefits. No hx of pancreatitis. No personal or family hx of MTC or MENII.          Relevant Medications    metFORMIN (GLUCOPHAGE XR) 500 MG 24 hr tablet    BYDUREON BCISE 2 MG/0.85ML auto-injector    semaglutide (OZEMPIC) 2 MG/3ML pen    Other Relevant Orders    Comprehensive metabolic panel (Completed)    Hemoglobin A1c (Completed)    Med Therapy Management Referral    S/P gastric sleeve procedure     S/p sleeve in  in [facility]. She is not positive on what bariatric surgery she had, but was told by the surgeon that 80% of her stomach was removed and denies any intestine changes.     Weight prior to surgery - 118kg - 260lb  Nikolay weight - 80kg - 176lb    Established care today for concerns of weight regain (30lbs in the past 2 years) and new onset abdominal pain and dysphagia.     She has had epigastric abdominal pain for the past 6-9months that occurs after eating any time. Also has abdominal pain with hunger. Pain will last around 3 hours, and will improve with laying down at times. Denies vomiting or blood in stool. Does have dark stools, but is currently on an iron supplement. Will  start omeprazole today and EGD ordered.     Dysphagia symptoms with red meat and chicken that was previously tolerated over the past 6 months. Cannot vomit when tries. Will relieve with rest after a few hours. Will get EGD to further evaluate.          Relevant Medications    Multiple Vitamins-Minerals (BARIATRIC MULTIVITAMINS/IRON) CAPS    Other Relevant Orders    Vitamin A    Vitamin B12 (Completed)    Vitamin D Deficiency (Completed)    Class 1 obesity with serious comorbidity and body mass index (BMI) of 33.0 to 33.9 in adult, unspecified obesity type    Relevant Medications    metFORMIN (GLUCOPHAGE XR) 500 MG 24 hr tablet    BYDUREON BCISE 2 MG/0.85ML auto-injector    semaglutide (OZEMPIC) 2 MG/3ML pen    Other Relevant Orders    Lipid panel reflex to direct LDL Fasting (Completed)    Parathyroid Hormone Intact (Completed)    Gastroesophageal reflux disease, unspecified whether esophagitis present    Relevant Medications    polyethylene glycol (MIRALAX) 17 GM/Dose powder    omeprazole (PRILOSEC) 20 MG DR capsule    Other Relevant Orders    Adult GI  Referral - Procedure Only    Iron deficiency anemia due to chronic blood loss - Primary    Relevant Orders    CBC with platelets    Ferritin    Iron and iron binding capacity     Other Visit Diagnoses       Anemia, unspecified type        Relevant Orders    CBC with platelets (Completed)    Ferritin (Completed)    Iron and iron binding capacity (Completed)           Start Ozempic 0.25mg once weekly for 4 weeks, then increase to 0.5mg once weekly. If approved, stop Bydrueon 1 week prior to starting Ozempic  Start Multivitamin daily   Anemia - iron infusion ordered. Most likely due to heavy menstrual cycles. Continue iron supplement. Will get repeat iron labs in 1 month.   GERD - start omeprazole 20mg daily   Constipation - start miralax 1 cap daily   EGD ordered for symptoms of dysphagia and abdominal pain  [facility] Pharmacist in 4 weeks   Candace Harrington in 3 months      116 minutes spent by me on the date of the encounter doing chart review, history and exam, documentation and further activities per the note    CHIEF COMPLAINT: Post-bariatric surgery follow-up.     HISTORY OF PRESENT ILLNESS:      2/22/2024     8:28 AM   Questions Regarding Prior Weight Loss Surgery Reviewed With Patient   I had the following weight loss procedure Sleeve Gastrectomy   What year was your surgery? 2021   How has your weight changed since your last visit? I have gained weight   Do you currently have any of the following Diabetes    Heartburn, acid reflux, or GERD (acid reflux disease)?    Hyperlipidemia (high cholesterol, triglycerides)?   Do you have any concerns today? no current concerns     S/p gastric sleeve in 2021 in [facility]. Is not 100% positive, but surgeon told her he removed 80% of stomach  Weight prior to surgery - 118kg - 260lb  Josemanuel weight - 80kg - 176lb    Overweight onset as early adult. Weight gain through 5 pregnancies, 6 children (1 set of twins). Previously weight stable around 140lbs.     Was able to maintain josemanuel weight for around 1 year. Weight regain over the past 2 years. Weight gain over was gradul, but feels like it was has been faster recently. Weight is not stable. Has tried to lose weight through increase exercise and low calorie diet. Wants to lose weight to help overall health.       Anti-obesity medications:   Bydureon - has been on this for 2 months for Type II diabetes. No side effects. Has not been helpful with weight loss.   Metformin 2000mg - has been on this for 2 months for type II diabetes. No side effects. No effect on weight.     Recent diet changes: Eating 2 meals a day, will have 2 snacks. Portion sizes around 1 cup. Can no longer eat chicken or red meat, or will have dysphagia symptoms. Sometimes will have abdominal pain with pasta, bread, and rice. Abdominal pain after every meal. No increase hunger. Increase thoughts of food. Can get full and stay  full. Craves sweets. Cooks all meals at home. No emotional or boredom eating.     -Protein? Protein focused  -Water? 48oz of water daily. Craves ice - for the past 4 months. Drinks coffee +cream (1 cup)    Recent exercise/activity changes: Very active around the house and with children. Previously went to the gym, but cannot currently due to back pain.     Vitamins/Labs: Vitamin D, Iron    No nausea, vomiting, diarrhea  Denies Dumping syndrome     Abdominal pain - epigastric pain, described as sharp, stabbing, squeezing pain. Will have pain after every time she eats and when she is hungry. Pain with hunger since surgery. Started over the past 6-9 months. Pain will last around 3 hours, and will eventually go away. Will lay down, and will help at times. Does not radiate.     Dysphagia - with any meat. Will try to throw up, but cannot. Will eventually pass. This has been going on for the past 6 months. Previously was able to eat meat in small pieces.     GERD - symptoms of burning in chest. Only when eats something oily. Does not take anything for it. Since surgery.     Constipation - has a BM every 2-3 days. If does not have a BM in 2 days will have tea and multigrain with relief - will do this 2xweek. Stools are hard. Has to push and strain. Will have black stools at times - but does take iron daily. No hematochezia.     Hypoglycemia - will feel light headed after eating wheat products. But this was before surgery even. Has passed out (unsure for how long), but not in the past 2 years.     Weight History:      2/22/2024     8:28 AM   --   What is your highest lifetime weight? 245   What is your lowest weight since surgery? (In pounds) 171.9     Initial Weight (lbs): 203.6 lbs  Weight: 92.4 kg (203 lb 9.6 oz)     Cumulative weight loss (lbs): 0  Weight Loss Percentage: 0%        2/22/2024     8:28 AM   Questions Regarding Co-Morbidities and Health Concerns Reviewed With Patient   Pre-diabetes Never   Diabetes II  Stayed the same   High Blood Pressure Never   High cholesterol Stayed the same   Heartburn/Reflux Stayed the same   Sleep apnea Never   PCOS Stayed the same   Back pain Stayed the same   Joint pain Stayed the same   Lower leg swelling Improved     CLAY - Has been on iron supplement for the past 2 months. Has very heavy menstrual cycles. Has needed iron infusions in the past for both menstrual cycle bleeding and in pregnancy. Last one in 2021.     Type II diabetes - taking metformin and bydureon. Checks BS daily. Fasting BS - 130-150, Post prandial BS - 150-200. Followed by PCP     HLD - not on mediations. Followed by PCP         2/22/2024     8:28 AM   Eating Habits   How many meals do you eat per day? 3   Do you snack between meals? No   How much food are you eating at each meal? 1/2 cup to 1 cup   Are you able to separate your meals and liquids by at least 30 minutes? No   Are you able to avoid liquid calories? No           2/22/2024     8:28 AM   Exercise Questions Reviewed With Patient   How often do you exercise? Less than 1 time per week   What is the duration of your exercise (in minutes)? I do not exercise.   What types of exercise do you do? I don't exercise   What keeps you from being more active? Lack of Time       Social History:      2/22/2024     8:28 AM   --   Are you smoking? No   Are you drinking alcohol? No     Stay at home. Supportive  with 6 children. Good support system.     No drugs or THC.     Medications:  Current Outpatient Medications   Medication    Blood Glucose Monitoring Suppl (ACCU-CHEK GUIDE) w/Device KIT    FERMIN BCISE 2 MG/0.85ML auto-injector    ferrous gluconate (FERGON) 324 (38 Fe) MG tablet    metFORMIN (GLUCOPHAGE XR) 500 MG 24 hr tablet    Multiple Vitamins-Minerals (BARIATRIC MULTIVITAMINS/IRON) CAPS    omeprazole (PRILOSEC) 20 MG DR capsule    polyethylene glycol (MIRALAX) 17 GM/Dose powder    prednisoLONE acetate (PRED FORTE) 1 % ophthalmic suspension     "semaglutide (OZEMPIC) 2 MG/3ML pen    vitamin D3 (CHOLECALCIFEROL) 1.25 MG (92636 UT) capsule     No current facility-administered medications for this visit.         2/22/2024     8:28 AM   --   Do you avoid NSAIDs such as (Ibuprofen, Aleve, Naproxen, Advil)? No     Only takes tylenol.     ROS:  GI:       2/22/2024     8:28 AM   --   Vomiting Yes   Diarrhea No   Constipation No   Swallowing trouble No   Abdominal pain Yes   Heartburn No     Skin:       2/22/2024     8:28 AM   BAR RBS ROS - SKIN   Rash in skin folds No     Psych:       2/22/2024     8:28 AM   --   Depression No   Anxiety No     Female Only:       2/22/2024     8:28 AM   BAR RBS ROS -    Female only Excessive menstrual bleeding    Regular menstrual cycles   Stress urinary incontinence No     Anti-obesity medication ROS:    HEENT  Hx of glaucoma: No    Cardiovascular  CAD:No  HTN:Yes - only when pregnant     Gastrointestinal  GERD:Yes  Constipation:Yes  Liver Dz:Yes - fatty liver  H/O Pancreatitis:No    Psychiatric  Bipolar: No  Anxiety:No  Depression:No  History of alcohol/drug abuse: No  Hx of eating disorder:No    Endocrine  Personal or family hx of MTC or MEN2:No  Diabetes/prediabetes: Yes    Neurologic:  Hx of seizures: No  Hx of migraines: No  Memory Impairment: No      History of kidney stones: Yes - was seen on CT, but has not passed.   Kidney disease: No  Current birth control:  no, but is not sexually active    Taking Opioid/Narcotic: No        Objective    /73 (BP Location: Left arm, Patient Position: Sitting, Cuff Size: Adult Large)   Pulse 88   Ht 1.651 m (5' 5\")   Wt 92.4 kg (203 lb 9.6 oz)   SpO2 100%   BMI 33.88 kg/m      Physical Exam   GENERAL: alert and no distress  EYES: Eyes grossly normal to inspection.  No discharge or erythema, or obvious scleral/conjunctival abnormalities.  RESP: No audible wheeze, cough, or visible cyanosis.    SKIN: Visible skin clear. No significant rash, abnormal pigmentation or " lesions.  NEURO: Cranial nerves grossly intact.  Mentation and speech appropriate for age.  PSYCH: Appropriate affect, tone, and pace of words          Sincerely,    Candace Rodriguez PA-C

## 2024-02-22 NOTE — TELEPHONE ENCOUNTER
"Patient wanted procedure done before she leaves the country on 3/15/24.  Explained to her that first available appt with Dr Juan is on 3/27/24.  Order is routine for priority.  Did offer her 3/27/24, but she declined stating that she will be out of the country and will be gone a couple of months.  Advised her to follow up with referring provider with any questions or concerns.    Endoscopy Scheduling Screen    Have you had a positive Covid test in the last 14 days?  No    Are you active on MyChart?   Yes    What insurance is in the chart?  Other:  [facility]    Ordering/Referring Provider: Michael   (If ordering provider performs procedure, schedule with ordering provider unless otherwise instructed. )    BMI: Estimated body mass index is 33.88 kg/m  as calculated from the following:    Height as of an earlier encounter on 2/22/24: 1.651 m (5' 5\").    Weight as of an earlier encounter on 2/22/24: 92.4 kg (203 lb 9.6 oz).     Sedation Ordered  moderate sedation.   If patient BMI > 50 do not schedule in ASC.    If patient BMI > 45 do not schedule at [facility].    Are you taking methadone or Suboxone?  No    Have you had difficulties, pain, or discomfort during past endoscopy procedures?  No    Are you taking any prescription medications for pain 3 or more times per week?   NO - No RN review required.    Do you have a history of malignant hyperthermia or adverse reaction to anesthesia?  No    (Females) Are you currently pregnant?   No     Have you been diagnosed or told you have pulmonary hypertension?   No    Do you have an LVAD?  No    Have you been told you have moderate to severe sleep apnea?  No    Have you been told you have COPD, asthma, or any other lung disease?  No    Do you have any heart conditions?  No     Have you ever had an organ transplant?   No    Have you ever had or are you awaiting a heart or lung transplant?   No    Have you had a stroke or transient ischemic attack (TIA aka \"mini stroke\" in the last 6 " "months?   No    Have you been diagnosed with or been told you have cirrhosis of the liver?   No    Are you currently on dialysis?   No    Do you need assistance transferring?   No    BMI: Estimated body mass index is 33.88 kg/m  as calculated from the following:    Height as of an earlier encounter on 2/22/24: 1.651 m (5' 5\").    Weight as of an earlier encounter on 2/22/24: 92.4 kg (203 lb 9.6 oz).     Is patients BMI > 40 and scheduling location [facility]?  No    Do you take an injectable medication for weight loss or diabetes (excluding insulin)?  No-Ozempic has been ordered but not started yet    Do you take the medication Naltrexone?  No    Do you take blood thinners?  No       Prep   Are you currently on dialysis or do you have chronic kidney disease?  No    Do you have a diagnosis of diabetes?  Yes (Golytely Prep)    Do you have a diagnosis of cystic fibrosis (CF)?  No    On a regular basis do you go 3 -5 days between bowel movements?  No    BMI > 40?  No    Preferred Pharmacy:    [facility]/pharmacy #[id number] [place], [place] - [address] [address]  [address] [address] [address]  Phone: [phone] Fax: [phone]      Final Scheduling Details   Colonoscopy prep sent?  N/A    Procedure scheduled  Upper endoscopy (EGD)    Surgeon:  Yessica langford order     Date of procedure:  5/29/24     Pre-OP / PAC:   No - Not required for this site.    Location  [facility] - Per order.    Sedation   Moderate Sedation - Per order.      Patient Reminders:   You will receive a call from a Nurse to review instructions and health history.  This assessment must be completed prior to your procedure.  Failure to complete the Nurse assessment may result in the procedure being cancelled.      On the day of your procedure, please designate an adult(s) who can drive you home stay with you for the next 24 hours. The medicines used in the exam will make you sleepy. You will not be able to drive.      You cannot take public transportation, ride share " services, or non-medical taxi service without a responsible caregiver.  Medical transport services are allowed with the requirement that a responsible caregiver will receive you at your destination.  We require that drivers and caregivers are confirmed prior to your procedure.

## 2024-02-23 ENCOUNTER — TELEPHONE (OUTPATIENT)
Dept: ENDOCRINOLOGY | Facility: CLINIC | Age: 39
End: 2024-02-23
Payer: COMMERCIAL

## 2024-02-23 DIAGNOSIS — E66.811 CLASS 1 OBESITY WITH SERIOUS COMORBIDITY AND BODY MASS INDEX (BMI) OF 33.0 TO 33.9 IN ADULT, UNSPECIFIED OBESITY TYPE: Primary | ICD-10-CM

## 2024-02-23 RX ORDER — SEMAGLUTIDE 0.5 MG/.5ML
0.5 INJECTION, SOLUTION SUBCUTANEOUS WEEKLY
Qty: 2 ML | Refills: 2 | Status: SHIPPED | OUTPATIENT
Start: 2024-03-24 | End: 2024-09-10

## 2024-02-23 RX ORDER — SEMAGLUTIDE 0.25 MG/.5ML
0.25 INJECTION, SOLUTION SUBCUTANEOUS WEEKLY
Qty: 2 ML | Refills: 0 | Status: SHIPPED | OUTPATIENT
Start: 2024-02-23 | End: 2024-06-03 | Stop reason: DRUGHIGH

## 2024-02-23 NOTE — TELEPHONE ENCOUNTER
PRIOR AUTHORIZATION DENIED    Medication: OZEMPIC (0.25 OR 0.5 MG/DOSE) 2 MG/3ML [facility]  Insurance Company: [employer] - Phone [phone] Fax [phone]  Denial Date: 2/22/2024  Denial Reason(s):   Appeal Information:

## 2024-02-23 NOTE — ASSESSMENT & PLAN NOTE
S/p sleeve in 2021 in [facility]. She is not positive on what bariatric surgery she had, but was told by the surgeon that 80% of her stomach was removed and denies any intestine changes.     Weight prior to surgery - 118kg - 260lb  Nikolay weight - 80kg - 176lb    Established care today for concerns of weight regain (30lbs in the past 2 years) and new onset abdominal pain and dysphagia.     She has had epigastric abdominal pain for the past 6-9months that occurs after eating any time. Also has abdominal pain with hunger. Pain will last around 3 hours, and will improve with laying down at times. Denies vomiting or blood in stool. Does have dark stools, but is currently on an iron supplement. Will start omeprazole today and EGD ordered.     Dysphagia symptoms with red meat and chicken that was previously tolerated over the past 6 months. Cannot vomit when tries. Will relieve with rest after a few hours. Will get EGD to further evaluate.

## 2024-02-23 NOTE — TELEPHONE ENCOUNTER
PA Initiation    Medication: WEGOVY 0.25 MG/0.5ML SC [facility]  Insurance Company: [employer] - Phone [phone] Fax [phone]  Pharmacy Filling the Rx:    Filling Pharmacy Phone:    Filling Pharmacy Fax:    Start Date: 2/23/2024    Key:[id number]

## 2024-02-23 NOTE — ASSESSMENT & PLAN NOTE
Currently taking Bydureon and Metformin. Has been on both of these medications for the past 2 months. No side effects. Has not been helpful weight weight loss. Checks BS manually 2xdaily. Average fasting 130-150. Will transition to Ozempic. No contraindications. Discussed side effects, risks, and benefits. No hx of pancreatitis. No personal or family hx of MTC or MENII.

## 2024-02-25 ENCOUNTER — HEALTH MAINTENANCE LETTER (OUTPATIENT)
Age: 39
End: 2024-02-25

## 2024-02-25 LAB
ANNOTATION COMMENT IMP: NORMAL
RETINYL PALMITATE SERPL-MCNC: 0.03 MG/L
VIT A SERPL-MCNC: 0.37 MG/L

## 2024-02-26 NOTE — TELEPHONE ENCOUNTER
Prior Authorization Approval    Medication: WEGOVY 0.25 MG/0.5ML SC [facility]  Authorization Effective Date: 2/24/2024  Authorization Expiration Date: 8/24/2024  Approved Dose/Quantity: 3ml/28 days   Reference #: [id number]   Insurance Company: [employer] - Phone [phone] Fax [phone]  Expected CoPay: $ 0  CoPay Card Available:      Financial Assistance Needed: no  Which Pharmacy is filling the prescription:    Pharmacy Notified: no  Patient Notified: pharmacy will notify patient

## 2024-03-05 ENCOUNTER — TELEPHONE (OUTPATIENT)
Dept: GASTROENTEROLOGY | Facility: CLINIC | Age: 39
End: 2024-03-05
Payer: COMMERCIAL

## 2024-03-05 ENCOUNTER — INFUSION THERAPY VISIT (OUTPATIENT)
Dept: INFUSION THERAPY | Facility: CLINIC | Age: 39
End: 2024-03-05
Payer: COMMERCIAL

## 2024-03-05 VITALS
HEART RATE: 91 BPM | DIASTOLIC BLOOD PRESSURE: 75 MMHG | TEMPERATURE: 98.5 F | SYSTOLIC BLOOD PRESSURE: 121 MMHG | OXYGEN SATURATION: 100 % | RESPIRATION RATE: 16 BRPM

## 2024-03-05 DIAGNOSIS — D50.0 IRON DEFICIENCY ANEMIA DUE TO CHRONIC BLOOD LOSS: Primary | ICD-10-CM

## 2024-03-05 PROCEDURE — 96365 THER/PROPH/DIAG IV INF INIT: CPT

## 2024-03-05 PROCEDURE — 250N000011 HC RX IP 250 OP 636: Mod: JZ

## 2024-03-05 PROCEDURE — 258N000003 HC RX IP 258 OP 636

## 2024-03-05 RX ORDER — HEPARIN SODIUM,PORCINE 10 UNIT/ML
5-20 VIAL (ML) INTRAVENOUS DAILY PRN
Status: CANCELLED | OUTPATIENT
Start: 2024-03-12

## 2024-03-05 RX ORDER — HEPARIN SODIUM (PORCINE) LOCK FLUSH IV SOLN 100 UNIT/ML 100 UNIT/ML
5 SOLUTION INTRAVENOUS
Status: CANCELLED | OUTPATIENT
Start: 2024-03-12

## 2024-03-05 RX ORDER — EPINEPHRINE 1 MG/ML
0.3 INJECTION, SOLUTION INTRAMUSCULAR; SUBCUTANEOUS EVERY 5 MIN PRN
Status: CANCELLED | OUTPATIENT
Start: 2024-03-12

## 2024-03-05 RX ORDER — MEPERIDINE HYDROCHLORIDE 25 MG/ML
25 INJECTION INTRAMUSCULAR; INTRAVENOUS; SUBCUTANEOUS EVERY 30 MIN PRN
Status: CANCELLED | OUTPATIENT
Start: 2024-03-12

## 2024-03-05 RX ORDER — METHYLPREDNISOLONE SODIUM SUCCINATE 125 MG/2ML
125 INJECTION, POWDER, LYOPHILIZED, FOR SOLUTION INTRAMUSCULAR; INTRAVENOUS
Status: CANCELLED
Start: 2024-03-12

## 2024-03-05 RX ORDER — ALBUTEROL SULFATE 0.83 MG/ML
2.5 SOLUTION RESPIRATORY (INHALATION)
Status: CANCELLED | OUTPATIENT
Start: 2024-03-12

## 2024-03-05 RX ORDER — DIPHENHYDRAMINE HYDROCHLORIDE 50 MG/ML
50 INJECTION INTRAMUSCULAR; INTRAVENOUS
Status: CANCELLED
Start: 2024-03-12

## 2024-03-05 RX ORDER — ALBUTEROL SULFATE 90 UG/1
1-2 AEROSOL, METERED RESPIRATORY (INHALATION)
Status: CANCELLED
Start: 2024-03-12

## 2024-03-05 RX ADMIN — SODIUM CHLORIDE 250 ML: 9 INJECTION, SOLUTION INTRAVENOUS at 15:19

## 2024-03-05 RX ADMIN — FERRIC CARBOXYMALTOSE INJECTION 750 MG: 50 INJECTION, SOLUTION INTRAVENOUS at 15:20

## 2024-03-05 ASSESSMENT — PAIN SCALES - GENERAL: PAINLEVEL: NO PAIN (0)

## 2024-03-05 NOTE — TELEPHONE ENCOUNTER
Add on EGD    Pre assessment completed for upcoming procedure.      Procedure details:    Patient scheduled for Upper endoscopy (EGD) on 3.6.24.     Arrival time: 0800. Procedure time 0900    Pre op exam needed? N/A    Facility location: [facility]; [address], [address], [place] [address]    Sedation type: Conscious sedation     Indication for procedure: hx of gastric sleeve. Sysmptoms of dysphagia, abdominal pain, and GERD. Rule out ulcer.     Designated  policy reviewed. Instructed to have someone stay 6 hours post procedure.       Chart review:     Electronic implanted devices? No    Recent diagnosis of diverticulitis within the last 6 weeks?  N/A    Diabetic? Yes. See medication holding recommendations.     Diabetic medication HOLDING recommendations: (if applicable)  Oral diabetic medications: Yes:  Metformin (glucophage): HOLD day of procedure.  Diabetic injectables: Yes- Ozempic (Semaglutide). Weekly dosing of medication.  Hold 7 days before procedure.  Follow up with managing provider. Last dose taken on 2.28.24  Insulin: No    Medication review:    Anticoagulants? No    NSAIDS? No    Other medication HOLDING recommendations:  N/A      Prep for procedure:   Prep instructions sent via [employer]     Reviewed procedure prep instructions.     Patient verbalized understanding and had no questions or concerns at this time.        Binta Goodman RN  Endoscopy Procedure Pre Assessment RN  [phone] option 4

## 2024-03-05 NOTE — PROGRESS NOTES
Infusion Nursing Note:  Sheila Vazquez presents today for Injectafer #1 of 2.    Patient seen by provider today: No   present during visit today: Not Applicable.    Note: medication information reviewed with patient, patient expressed understanding.      Intravenous Access:  Peripheral IV placed.    Treatment Conditions:  Not Applicable.      Post Infusion Assessment:  Patient tolerated infusion without incident.  Patient observed for 30 minutes post injectafer per protocol.  Blood return noted pre and post infusion.  Site patent and intact, free from redness, edema or discomfort.  No evidence of extravasations.  Access discontinued per protocol.       Discharge Plan:   Discharge instructions reviewed with: Patient.  Patient and/or family verbalized understanding of discharge instructions and all questions answered.  AVS to patient via [employer].  Patient will return 3/12 for next appointment.   Patient discharged in stable condition accompanied by: self.  Departure Mode: Ambulatory.      Kamini Pollard RN

## 2024-03-05 NOTE — TELEPHONE ENCOUNTER
Caller: Writer to patient.     Reason for Reschedule/Cancellation   (please be detailed, any staff messages or encounters to note?): Will be out of the country for [place].       Prior to reschedule please review:  Ordering Provider: Candace Rodriguez PA-C   Sedation Determined: Moderate  Does patient have any ASC Exclusions, please identify?: No      Notes on Cancelled Procedure:  Procedure: Upper Endoscopy [EGD]   Date: 5/29/2024  Location: [facility]; [address], [address] [place] [address]   Surgeon: Yessica      Rescheduled: Yes,   Procedure: Upper Endoscopy [EGD]    Date: 3/6/2024   Location: [facility]; [address], [address] [place] [address]    Surgeon: MELISA Bryan   Sedation Level Scheduled  Moderate,  Reason for Sedation Level    Instructions updated and sent: Order    Does patient need PAC or Pre -Op Rescheduled? : MyChart       Did you cancel or rescheduled an EUS procedure? No.

## 2024-03-06 ENCOUNTER — HOSPITAL ENCOUNTER (OUTPATIENT)
Facility: CLINIC | Age: 39
Discharge: HOME | End: 2024-03-06
Attending: SURGERY | Admitting: SURGERY
Payer: COMMERCIAL

## 2024-03-06 VITALS
SYSTOLIC BLOOD PRESSURE: 110 MMHG | HEART RATE: 90 BPM | OXYGEN SATURATION: 100 % | DIASTOLIC BLOOD PRESSURE: 78 MMHG | RESPIRATION RATE: 16 BRPM

## 2024-03-06 LAB
GLUCOSE BLDC GLUCOMTR-MCNC: 97 MG/DL (ref 70–99)
UPPER GI ENDOSCOPY: NORMAL

## 2024-03-06 PROCEDURE — 250N000011 HC RX IP 250 OP 636: Performed by: SURGERY

## 2024-03-06 PROCEDURE — 82962 GLUCOSE BLOOD TEST: CPT

## 2024-03-06 PROCEDURE — 250N000009 HC RX 250: Performed by: SURGERY

## 2024-03-06 PROCEDURE — 999N000099 HC STATISTIC MODERATE SEDATION < 10 MIN: Performed by: SURGERY

## 2024-03-06 PROCEDURE — 43235 EGD DIAGNOSTIC BRUSH WASH: CPT | Performed by: SURGERY

## 2024-03-06 RX ORDER — FENTANYL CITRATE 50 UG/ML
INJECTION, SOLUTION INTRAMUSCULAR; INTRAVENOUS PRN
Status: DISCONTINUED | OUTPATIENT
Start: 2024-03-06 | End: 2024-03-06 | Stop reason: HOSPADM

## 2024-03-06 RX ORDER — NALOXONE HYDROCHLORIDE 0.4 MG/ML
0.2 INJECTION, SOLUTION INTRAMUSCULAR; INTRAVENOUS; SUBCUTANEOUS
Status: CANCELLED | OUTPATIENT
Start: 2024-03-06

## 2024-03-06 RX ORDER — ONDANSETRON 2 MG/ML
4 INJECTION INTRAMUSCULAR; INTRAVENOUS
Status: DISCONTINUED | OUTPATIENT
Start: 2024-03-06 | End: 2024-03-06 | Stop reason: HOSPADM

## 2024-03-06 RX ORDER — NALOXONE HYDROCHLORIDE 0.4 MG/ML
0.4 INJECTION, SOLUTION INTRAMUSCULAR; INTRAVENOUS; SUBCUTANEOUS
Status: CANCELLED | OUTPATIENT
Start: 2024-03-06

## 2024-03-06 RX ORDER — FLUMAZENIL 0.1 MG/ML
0.2 INJECTION, SOLUTION INTRAVENOUS
Status: CANCELLED | OUTPATIENT
Start: 2024-03-06 | End: 2024-03-06

## 2024-03-06 RX ORDER — ONDANSETRON 2 MG/ML
4 INJECTION INTRAMUSCULAR; INTRAVENOUS EVERY 6 HOURS PRN
Status: CANCELLED | OUTPATIENT
Start: 2024-03-06

## 2024-03-06 RX ORDER — ONDANSETRON 4 MG/1
4 TABLET, ORALLY DISINTEGRATING ORAL EVERY 6 HOURS PRN
Status: CANCELLED | OUTPATIENT
Start: 2024-03-06

## 2024-03-06 RX ORDER — PROCHLORPERAZINE MALEATE 5 MG
10 TABLET ORAL EVERY 6 HOURS PRN
Status: CANCELLED | OUTPATIENT
Start: 2024-03-06

## 2024-03-06 RX ORDER — LIDOCAINE 40 MG/G
CREAM TOPICAL
Status: DISCONTINUED | OUTPATIENT
Start: 2024-03-06 | End: 2024-03-06 | Stop reason: HOSPADM

## 2024-03-06 ASSESSMENT — ACTIVITIES OF DAILY LIVING (ADL)
ADLS_ACUITY_SCORE: 35
ADLS_ACUITY_SCORE: 35
ADLS_ACUITY_SCORE: 33

## 2024-03-06 NOTE — PROGRESS NOTES
Risks of EGD- unable to complete, respiratory diffiuclty, perforation, bleeding    Discussed with patient    Lamine Bryan MD

## 2024-03-12 ENCOUNTER — INFUSION THERAPY VISIT (OUTPATIENT)
Dept: INFUSION THERAPY | Facility: CLINIC | Age: 39
End: 2024-03-12
Payer: COMMERCIAL

## 2024-03-12 VITALS
OXYGEN SATURATION: 100 % | HEART RATE: 89 BPM | RESPIRATION RATE: 16 BRPM | TEMPERATURE: 97.8 F | DIASTOLIC BLOOD PRESSURE: 75 MMHG | SYSTOLIC BLOOD PRESSURE: 110 MMHG

## 2024-03-12 DIAGNOSIS — D50.0 IRON DEFICIENCY ANEMIA DUE TO CHRONIC BLOOD LOSS: Primary | ICD-10-CM

## 2024-03-12 PROCEDURE — 258N000003 HC RX IP 258 OP 636

## 2024-03-12 PROCEDURE — 250N000011 HC RX IP 250 OP 636: Mod: JZ

## 2024-03-12 PROCEDURE — 96374 THER/PROPH/DIAG INJ IV PUSH: CPT

## 2024-03-12 RX ORDER — MEPERIDINE HYDROCHLORIDE 25 MG/ML
25 INJECTION INTRAMUSCULAR; INTRAVENOUS; SUBCUTANEOUS EVERY 30 MIN PRN
OUTPATIENT
Start: 2024-03-12

## 2024-03-12 RX ORDER — DIPHENHYDRAMINE HYDROCHLORIDE 50 MG/ML
50 INJECTION INTRAMUSCULAR; INTRAVENOUS
Start: 2024-03-12

## 2024-03-12 RX ORDER — EPINEPHRINE 1 MG/ML
0.3 INJECTION, SOLUTION INTRAMUSCULAR; SUBCUTANEOUS EVERY 5 MIN PRN
OUTPATIENT
Start: 2024-03-12

## 2024-03-12 RX ORDER — HEPARIN SODIUM,PORCINE 10 UNIT/ML
5-20 VIAL (ML) INTRAVENOUS DAILY PRN
OUTPATIENT
Start: 2024-03-12

## 2024-03-12 RX ORDER — HEPARIN SODIUM (PORCINE) LOCK FLUSH IV SOLN 100 UNIT/ML 100 UNIT/ML
5 SOLUTION INTRAVENOUS
OUTPATIENT
Start: 2024-03-12

## 2024-03-12 RX ORDER — ALBUTEROL SULFATE 0.83 MG/ML
2.5 SOLUTION RESPIRATORY (INHALATION)
OUTPATIENT
Start: 2024-03-12

## 2024-03-12 RX ORDER — ALBUTEROL SULFATE 90 UG/1
1-2 AEROSOL, METERED RESPIRATORY (INHALATION)
Start: 2024-03-12

## 2024-03-12 RX ORDER — METHYLPREDNISOLONE SODIUM SUCCINATE 125 MG/2ML
125 INJECTION, POWDER, LYOPHILIZED, FOR SOLUTION INTRAMUSCULAR; INTRAVENOUS
Start: 2024-03-12

## 2024-03-12 RX ADMIN — FERRIC CARBOXYMALTOSE INJECTION 750 MG: 50 INJECTION, SOLUTION INTRAVENOUS at 15:28

## 2024-03-12 RX ADMIN — SODIUM CHLORIDE 250 ML: 9 INJECTION, SOLUTION INTRAVENOUS at 15:28

## 2024-03-12 NOTE — PROGRESS NOTES
Infusion Nursing Note:  Sheila Vazquez presents today for Injectafer.    Patient seen by provider today: No   present during visit today: Not Applicable.    Note: had a bit of a headache from last infusion.    Intravenous Access:  Peripheral IV placed.    Treatment Conditions:  Not Applicable.      Post Infusion Assessment:  Patient tolerated infusion without incident.  Patient observed for 30 minutes post injectafer per protocol.       Discharge Plan:   Discharge instructions reviewed with: Patient.  AVS to patient via [employer].  Patient will return PRN for next appointment.   Patient discharged in stable condition accompanied by: self.  Departure Mode: Ambulatory.      Stephanie Hairston RN

## 2024-04-02 ENCOUNTER — TELEPHONE (OUTPATIENT)
Dept: ENDOCRINOLOGY | Facility: CLINIC | Age: 39
End: 2024-04-02

## 2024-04-02 NOTE — TELEPHONE ENCOUNTER
MTM appointment no showed, we made one more attempt to reschedule.     Routing back to referring provider and MTM pharmacist.     [employer] Message Sent    Mayra Watson CPhT  MTM

## 2024-06-03 ENCOUNTER — OFFICE VISIT (OUTPATIENT)
Dept: ENDOCRINOLOGY | Facility: CLINIC | Age: 39
End: 2024-06-03
Payer: COMMERCIAL

## 2024-06-03 ENCOUNTER — LAB (OUTPATIENT)
Dept: LAB | Facility: CLINIC | Age: 39
End: 2024-06-03
Payer: COMMERCIAL

## 2024-06-03 VITALS
OXYGEN SATURATION: 97 % | SYSTOLIC BLOOD PRESSURE: 112 MMHG | HEART RATE: 68 BPM | DIASTOLIC BLOOD PRESSURE: 70 MMHG | WEIGHT: 204.8 LBS | BODY MASS INDEX: 34.12 KG/M2 | HEIGHT: 65 IN

## 2024-06-03 DIAGNOSIS — D50.0 IRON DEFICIENCY ANEMIA DUE TO CHRONIC BLOOD LOSS: ICD-10-CM

## 2024-06-03 DIAGNOSIS — E66.811 CLASS 1 OBESITY WITH SERIOUS COMORBIDITY AND BODY MASS INDEX (BMI) OF 33.0 TO 33.9 IN ADULT, UNSPECIFIED OBESITY TYPE: Primary | ICD-10-CM

## 2024-06-03 LAB
ERYTHROCYTE [DISTWIDTH] IN BLOOD BY AUTOMATED COUNT: 13.5 % (ref 10–15)
FERRITIN SERPL-MCNC: 44 NG/ML (ref 6–175)
HCT VFR BLD AUTO: 43.3 % (ref 35–47)
HGB BLD-MCNC: 14.4 G/DL (ref 11.7–15.7)
IRON BINDING CAPACITY (ROCHE): 294 UG/DL (ref 240–430)
IRON SATN MFR SERPL: 16 % (ref 15–46)
IRON SERPL-MCNC: 46 UG/DL (ref 37–145)
MCH RBC QN AUTO: 27.7 PG (ref 26.5–33)
MCHC RBC AUTO-ENTMCNC: 33.3 G/DL (ref 31.5–36.5)
MCV RBC AUTO: 83 FL (ref 78–100)
PLATELET # BLD AUTO: 356 10E3/UL (ref 150–450)
RBC # BLD AUTO: 5.19 10E6/UL (ref 3.8–5.2)
WBC # BLD AUTO: 5.7 10E3/UL (ref 4–11)

## 2024-06-03 PROCEDURE — G2211 COMPLEX E/M VISIT ADD ON: HCPCS

## 2024-06-03 PROCEDURE — 99214 OFFICE O/P EST MOD 30 MIN: CPT

## 2024-06-03 PROCEDURE — 36415 COLL VENOUS BLD VENIPUNCTURE: CPT | Performed by: PATHOLOGY

## 2024-06-03 PROCEDURE — 83550 IRON BINDING TEST: CPT | Performed by: PATHOLOGY

## 2024-06-03 PROCEDURE — 82728 ASSAY OF FERRITIN: CPT | Performed by: PATHOLOGY

## 2024-06-03 PROCEDURE — 83540 ASSAY OF IRON: CPT | Performed by: PATHOLOGY

## 2024-06-03 PROCEDURE — 85027 COMPLETE CBC AUTOMATED: CPT | Performed by: PATHOLOGY

## 2024-06-03 RX ORDER — SEMAGLUTIDE 1 MG/.5ML
1 INJECTION, SOLUTION SUBCUTANEOUS WEEKLY
Qty: 2 ML | Refills: 3 | Status: SHIPPED | OUTPATIENT
Start: 2024-06-03

## 2024-06-03 ASSESSMENT — PAIN SCALES - GENERAL: PAINLEVEL: NO PAIN (0)

## 2024-06-03 NOTE — NURSING NOTE
"(   Chief Complaint   Patient presents with    RECHECK     Return bariatric    )    ( Weight: 92.9 kg (204 lb 12.8 oz) )  ( Height: 165.1 cm (5' 5\") )  ( BMI (Calculated): 34.08 )  (   )  ( Cumulative weight loss (lbs): -1.2 )  ( Last Visits Weight: 92.4 kg (203 lb 9.6 oz) )  ( Wt change since last visit (lbs): 1.2 )  (   )  (   )    ( BP: 112/70 )  (   )  (   )  (   )  ( Pulse: 68 )  (   )  ( SpO2: 97 % )    (   Patient Active Problem List   Diagnosis    Cervical high risk HPV (human papillomavirus) test positive    Chronic idiopathic constipation    Type 2 diabetes mellitus without complication, without long-term current use of insulin (H)    Vasovagal syncope    HLD (hyperlipidemia)    Hepatic steatosis    S/P gastric sleeve procedure    Class 1 obesity with serious comorbidity and body mass index (BMI) of 33.0 to 33.9 in adult, unspecified obesity type    Gastroesophageal reflux disease, unspecified whether esophagitis present    Iron deficiency anemia due to chronic blood loss    )  (   Current Outpatient Medications   Medication Sig Dispense Refill    Blood Glucose Monitoring Suppl (ACCU-CHEK GUIDE) w/Device KIT       DAVIDDUSHUKRI BCISE 2 MG/0.85ML auto-injector INJECT 2 MG SUBCUTANEOUSLY ONCE WEEKLY      ferrous gluconate (FERGON) 324 (38 Fe) MG tablet Take 1 tablet (324 mg) by mouth 2 times daily (with meals) 60 tablet 0    metFORMIN (GLUCOPHAGE XR) 500 MG 24 hr tablet Take 500 mg by mouth      Multiple Vitamins-Minerals (BARIATRIC MULTIVITAMINS/IRON) CAPS Take 1 capsule by mouth 2 times daily 180 capsule 3    omeprazole (PRILOSEC) 20 MG DR capsule Take 1 capsule (20 mg) by mouth daily 30 capsule 3    polyethylene glycol (MIRALAX) 17 GM/Dose powder Take 17 g (1 Capful) by mouth daily 578 g 3    prednisoLONE acetate (PRED FORTE) 1 % ophthalmic suspension PLEASE SEE ATTACHED FOR DETAILED DIRECTIONS      Semaglutide-Weight Management (WEGOVY) 0.25 MG/0.5ML pen Inject 0.25 mg Subcutaneous once a week For 4 weeks 2 " mL 0    Semaglutide-Weight Management (WEGOVY) 0.5 MG/0.5ML pen Inject 0.5 mg Subcutaneous once a week After completing 4 weeks of 0.25mg dose 2 mL 2    vitamin D3 (CHOLECALCIFEROL) 1.25 MG (25478 UT) capsule Take 50,000 Units by mouth      semaglutide (OZEMPIC) 2 MG/3ML pen Injection subcutaneously for 0.25mg every 7 days for 4 weeks, and increase to 0.5mg every 7 days. (Patient not taking: Reported on 3/12/2024) 3 mL 2    )  ( Diabetes Eval:    )    ( Pain Eval:  No Pain (0) )    ( Wound Eval:       )    (   History   Smoking Status    Never   Smokeless Tobacco    Never    )    ( Signed By:  Steffen Alatorre, EMT; Elvira 3, 2024; 1:56 PM )

## 2024-06-03 NOTE — PATIENT INSTRUCTIONS
"Lalo Sosa,  Thank you for allowing us the privilege of caring for you. We hope we provided you with the excellent service you deserve.   Please let us know if there is anything else we can do for you so that we can be sure you are completely satisfied with your care experience.    To ensure the quality of our services you may be receiving a patient satisfaction survey from an independent patient satisfaction monitoring company.    The greatest compliment you can give is a \"Likely to Recommend\"    Your visit was with Candace Rodriguez PA-C today.    Instructions per today's visit:     Increase Wegovy 1.0mg once weekly   Start taking Omeprazole 20mg once daily to see if helps with GERD symptoms and abdominal pain. No refills needed.   Hair loss - can try Biotin and Collagen. Hair, skin and nails vitamin could also be helpful.   Iron labs to be repeated today. Will reach out if another iron infusion again. Continue oral iron supplement   Anabella, Esther or Leatha in 3 months   Candace Harrington PA-C in 6 months     ___________________________________________________________________________  Important contact and scheduling information:  Please call our contact center at [phone] to schedule your next appointments.  For any nursing questions or concerns call Binta Aguero LPN at [phone] or Milagros Perez RN at [phone]  Please call during clinic hours Monday through Friday 8:00a - 4:00p if you have questions or you can contact us via [employer] at anytime and we will reply during clinic hours.    Lab results will be communicated through My Chart or letter (if My Chart not used). Please call the clinic if you have not received communication after 1 week or if you have any questions.?  Clinic Fax: [phone]  __________________________________________________________________________    If labs were ordered today:    Please make an appointment to have them drawn at your convenience.     To schedule the Lab Appointment " "using [employer]:  Select \"Schedule an Appointment\"  Select \"Lab Only\"  For \"A couple of questions\", select \"Other\"  For \"Which locations work for you?, select the location and set up the appointment    To schedule by phone call [phone] to schedule a lab only appointment at any [facility] lab.  ___________________________________________________________________________  Work with A Health !  Virtual Sessions are Available through [facility] Clinics    To learn more, call to schedule a free, Health  Q&A appointment: [phone]     What is Health Coaching?  Do you know what you are supposed to do, but you just aren't doing it?  Then, HEALTH COACHING may help you!   Get unstuck and move forward with the support of a professionally trained NBC-HWC (National Board-Certified Health and ) who uses evidence-based approaches to help you move forward with healthy lifestyle changes in the areas of weight loss, stress management and overall well-being.    Health Coaches help you identify goals that will work best for you. Health Coaches provide support and encouragement with overcoming barriers and help you to find inspiration and motivation to lead a healthy lifestyle.    Option one:  Health Coaching 3-Pack; Three, 30-minute Health Coaching Visits, for $99  Visits are done virtually (phone or video)  This is a self pay service; we do not accept insurance for shaun coaching.    Option two:   The 24 week Plan; 11 Health Coaching Visits, and a 7 months subscription to [email or web address]-- on-demand fitness, nutrition and mindfulness classes, for $499 (employee discounts may be available). Participants will also meet regularly with a weight management Medical Provider and a Registered/Licensed Dietician.  This is a self-pay service; we do not accept insurance for health coaching.    To Schedule a free Health  Q&A appointment to learn more,  call " "[phone].  ____________________________________________________________________  [facility]  Healthy Lifestyle Group    Healthy Lifestyle Group  This is a 60 minute virtual coaching group for those who want to lead a healthier lifestyle. Come together to set goals and overcome barriers in a supportive group environment. We will address the four pillars of health--nutrition, exercise, sleep and emotional well-being.  This group is highly recommended for those who are participating in the 24 week Healthy Lifestyle Plan and our Health Coaching sessions.    WHEN: This group meets the first Friday of the month, 12:30 PM - 1:30 PM online, via a zoom meeting.      FACILITATOR: Led by National Board Certified Health and , [facility].    TO REGISTER: Please call the Call Center at [phone] to register. You will get an appointment to attend in [facility]. Fifteen minutes prior to the meeting, complete the e-check in and you will get the link to join the meeting.  There is no charge to attend this group and space is limited.      2023 and 2024 Meeting Topics and Dates:    November 3: Introduction to Mindfulness (Learn simple and effective mindfulness practices and how it can benefit you)    December 8: Let's Talk (guided discussion on our wins and challenges)    January 5: New Years Vision: Manifest your Best 2024! (Guided imagery,  journaling and discussion)    February 2: Let's Talk    March 1: 10 Percent Happier by Carrillo Decker (Book Bites; a guided discussion on the nuggets of wisdom from favorite wellness books; no need to read the book but highly encouraged)    April 5: Let's Talk    May 3: \"Essentialism; The Disciplined Pursuit of Less by Kevan Montemayor (book bites discussion)    June 7: Let's Talk    July 5: NO MEETING, off for the 4th of July Holiday    August 2: The Blue Zones, Secrets for Living a Longer Life by Carrillo Berg (book bites " discussion)      If you would like bariatric surgery specific support group info please let your care team know.         Thank you,   MARCIA Ernandez [facility] Weight Management Team

## 2024-06-03 NOTE — LETTER
6/3/2024       RE: Sheila Vazquez  [address]  [facility] [address]     Dear Colleague,    Thank you for referring your patient, Sheila Vazquez, to the [facility]. Please see a copy of my visit note below.    Return Bariatric Surgery Note    RE: Sheila Vazquez  MR#: [id number]  : 1985  VISIT DATE: Remi 3, 2024      Dear Clinic, [facility],    I had the pleasure of seeing your patient, Sheila Vazquez, in my post-bariatric surgery assessment clinic.    Assessment & Plan  Problem List Items Addressed This Visit       Class 1 obesity with serious comorbidity and body mass index (BMI) of 33.0 to 33.9 in adult, unspecified obesity type - Primary    Relevant Medications    Semaglutide-Weight Management (WEGOVY) 1 MG/0.5ML pen        Increase Wegovy 1.0mg once weekly   Start taking Omeprazole 20mg once daily to see if helps with GERD symptoms and abdominal pain. No refills needed.   Hair loss - can try Biotin and Collagen. Hair, skin and nails vitamin could also be helpful.   Iron labs to be repeated today. Will reach out if another iron infusion again. Continue oral iron supplement   Esther Kyle or Leatha in 3 months   Candace Harrington PA-C in 6 months       35 minutes spent by me on the date of the encounter doing chart review, history and exam, documentation and further activities per the note    CHIEF COMPLAINT: Post-bariatric surgery follow-up.    HISTORY OF PRESENT ILLNESS:      6/3/2024     1:35 PM   Questions Regarding Prior Weight Loss Surgery Reviewed With Patient   I had the following weight loss procedure Sleeve Gastrectomy   What year was your surgery?    How has your weight changed since your last visit? I have gained weight   Do you currently have any of the following Diabetes    Heartburn, acid reflux, or GERD (acid reflux disease)?    Hypertension (high blood pressure)?     Hyperlipidemia (high cholesterol, triglycerides)?   Do you have any concerns today? none     S/p sleeve in 2021 in [place].   Weight prior to surgery - 118kg - 260lb  Nikolay weight - 80kg - 176lb    Anti-obesity medications:     Current:   Wegovy 0.5mg - has been on this dose for 2 months. No side effects. Not helpful with weight loss. Having hunger and sweet cravings.     Ozempic not covered needed to trail other medications first.     Recent diet changes: Eating small snacks throughout the day with 1 meal at night. Might have a snack after dinner. If sees food, feels like she needs it. Will have night time hunger as well. No changes in portion sizes. Drinking 48oz of water daily. Drinking 3 cups of coffee     Recent exercise/activity changes: Has been active with children. Will walk 3-4xweek for around 1 hour.     Recent sleep changes: stable     Vitamins/Labs: MV, Vitamin D, Fe    GERD - Will have symptoms with food - typically with food with skin or fried foods. At that time will take the Omeprazole when has symptoms.     Dysphagia - has improved overall. Had one episode last week with rice and meat. EGD completed - showed 2cm hiatail hernia otherwise was normal.     Abdominal pain - continues to have pain daily     CLAY - felt better after iron infusion, but over the past month has been having increase dizziness again.     Weight History:      6/3/2024     1:35 PM   --   What is your highest lifetime weight? 245   What is your lowest weight since surgery? (In pounds) 165     Initial Weight (lbs): 203.6 lbs  Weight: 92.9 kg (204 lb 12.8 oz)  Last Visits Weight: 92.4 kg (203 lb 9.6 oz)  Cumulative weight loss (lbs): -1.2  Weight Loss Percentage: -0.59%    Wt Readings from Last 5 Encounters:   06/03/24 92.9 kg (204 lb 12.8 oz)   02/22/24 92.4 kg (203 lb 9.6 oz)   11/26/22 95.4 kg (210 lb 6.4 oz)   03/10/20 100.7 kg (222 lb)             6/3/2024     1:35 PM   Questions Regarding Co-Morbidities and Health Concerns  Reviewed With Patient   Pre-diabetes Improved   Diabetes II Improved   How many years have you had diabetes? 13   High Blood Pressure Improved   High cholesterol Improved   Heartburn/Reflux Improved   Sleep apnea Never   PCOS Never   Back pain Worsened   Joint pain Stayed the same   Lower leg swelling Never           6/3/2024     1:35 PM   Eating Habits   How many meals do you eat per day? 3   Do you snack between meals? No   How much food are you eating at each meal? 1/2 cup to 1 cup   Are you able to separate your meals and liquids by at least 30 minutes? Sometimes   Are you able to avoid liquid calories? Yes           6/3/2024     1:35 PM   Exercise Questions Reviewed With Patient   How often do you exercise? Less than 1 time per week   What is the duration of your exercise (in minutes)? 60+ Minutes   What types of exercise do you do? walking   What keeps you from being more active? I should be more active but I just have not gotten around to it    My ability to walk or move around is limited    Too tired       Social History:      6/3/2024     1:35 PM   --   Are you smoking? No   Are you drinking alcohol? No       Medications:  Current Outpatient Medications   Medication Sig Dispense Refill    Blood Glucose Monitoring Suppl (ACCU-CHEK GUIDE) w/Device KIT       FERMIN BCISE 2 MG/0.85ML auto-injector INJECT 2 MG SUBCUTANEOUSLY ONCE WEEKLY      ferrous gluconate (FERGON) 324 (38 Fe) MG tablet Take 1 tablet (324 mg) by mouth 2 times daily (with meals) 60 tablet 0    metFORMIN (GLUCOPHAGE XR) 500 MG 24 hr tablet Take 500 mg by mouth      Multiple Vitamins-Minerals (BARIATRIC MULTIVITAMINS/IRON) CAPS Take 1 capsule by mouth 2 times daily 180 capsule 3    omeprazole (PRILOSEC) 20 MG DR capsule Take 1 capsule (20 mg) by mouth daily 30 capsule 3    polyethylene glycol (MIRALAX) 17 GM/Dose powder Take 17 g (1 Capful) by mouth daily 578 g 3    prednisoLONE acetate (PRED FORTE) 1 % ophthalmic suspension PLEASE SEE  ATTACHED FOR DETAILED DIRECTIONS      Semaglutide-Weight Management (WEGOVY) 0.5 MG/0.5ML pen Inject 0.5 mg Subcutaneous once a week After completing 4 weeks of 0.25mg dose 2 mL 2    Semaglutide-Weight Management (WEGOVY) 1 MG/0.5ML pen Inject 1 mg Subcutaneous once a week 2 mL 3    vitamin D3 (CHOLECALCIFEROL) 1.25 MG (40481 UT) capsule Take 50,000 Units by mouth       No current facility-administered medications for this visit.         6/3/2024     1:35 PM   --   Do you avoid NSAIDs such as (Ibuprofen, Aleve, Naproxen, Advil)? Yes       ROS:  GI:       6/3/2024     1:35 PM   --   Vomiting Yes   Diarrhea No   Constipation Yes   Swallowing trouble Yes   Abdominal pain Yes   Heartburn Yes     Skin:       6/3/2024     1:35 PM   BAR RBS ROS - SKIN   Rash in skin folds No     Psych:       6/3/2024     1:35 PM   --   Depression No   Anxiety Yes     Female Only:       6/3/2024     1:35 PM   BAR RBS ROS -    Female only None of the above   Stress urinary incontinence No       Admission on 03/06/2024, Discharged on 03/06/2024   Component Date Value Ref Range Status    GLUCOSE BY METER POCT 03/06/2024 97  70 - 99 mg/dL Final    Upper GI Endoscopy 03/06/2024    Final                    Value:[address] [place] [address] [phone]     Endoscopy Department  _______________________________________________________________________________  Patient Name: Sheila Vazquez            Procedure Date: 3/6/2024 7:42 AM  MRN: [id number]                       Account Number: [id number]  YOB: 1985               Admit Type: Outpatient  Age: 39                               Room: [place] [address]  Gender: Female                        Note Status: Finalized  Attending MD: HERBER BROWN , ,    Total Sedation Time:   _______________________________________________________________________________     Procedure:             Upper GI endoscopy  Indications:           Diagnostic  procedure, Epigastric abdominal pain,                          history of bariatric surgery in 2021, n/a  Providers:             HERBER BROWN, Lary Peralta, RN, Carlotta Zapata RN  Referring MD:          EDWARD KENNEDY  Medicines:             Fentanyl 75 micrograms IV, Midazolam 3 mg IV  Complications:         No immediate complications.  _______________________________________________________________________________  Procedure:             Pre-Anesthesia Assessment:                         - Prior to the procedure, a History and Physical was                          performed, and patient medications and allergies were                          reviewed. The patient is competent. The risks and                          benefits of the procedure and the sedation options and                          risks were discussed with the patient. All questions                          were answered and informed consent was obtained.                          Patient identification and proposed procedure were                          verified by the physician in the pre-procedure area.                          Mental Status Examination: alert and oriented.                          Prophylactic Antibiotics: The p                          atient does not require                          prophylactic antibiotics. Prior Anticoagulants: The                          patient has taken no anticoagulant or antiplatelet                          agents. ASA Grade Assessment: II - A patient with mild                          systemic disease. After reviewing the risks and                          benefits, the patient was deemed in satisfactory                          condition to undergo the procedure. The anesthesia                          plan was to use moderate sedation / analgesia                          (conscious sedation). Immediately prior to                           administration of medications, the patient was                          re-assessed for adequacy to receive sedatives. The                          heart rate, respiratory rate, oxygen saturations,                          blood pressure, adequacy of pulmonary ventilation, and                          response to care were monitored throughout the                                                    procedure. The physical status of the patient was                          re-assessed after the procedure.                         After obtaining informed consent, the endoscope was                          passed under direct vision. Throughout the procedure,                          the patient's blood pressure, pulse, and oxygen                          saturations were monitored continuously. The Endoscope                          was introduced through the mouth, and advanced to the                          second part of duodenum. The upper GI endoscopy was                          accomplished with ease. The patient tolerated the                          procedure well.                                                                                   Findings:       No endoscopic abnormality was evident in the esophagus to explain the        patient's complaint of dysphagia.       A 2 cm hiatal hernia was present.       consistent with prior sleeve gastrecto                          my anatomy. one visible staple.        very mild tortuosity.       A 2 cm hiatal hernia was present. visible on retroflexion       The in the duodenum was normal.                                                                                   Impression:            - No endoscopic esophageal abnormality to explain                          patient's dysphagia.                         - 2 cm hiatal hernia.                         - 2 cm hiatal hernia.                         - Normal.                         - No specimens  "collected.  Recommendation:        - The patient will be observed post-procedure, until                          all discharge criteria are met.                                                                                     ___________________  HERBER BROWN,   3/6/2024 9:24:35 AM  Number of Addenda: 0    Note Initiated On: 3/6/2024 7:42 AM  Scope In:  Scope Out:       Age less than 45, no DEXA indicated.          Objective   /70 (BP Location: Left arm, Patient Position: Sitting, Cuff Size: Adult Large)   Pulse 68   Ht 1.651 m (5' 5\")   Wt 92.9 kg (204 lb 12.8 oz)   SpO2 97%   BMI 34.08 kg/m      Physical Exam   GENERAL: alert and no distress  EYES: Eyes grossly normal to inspection.  No discharge or erythema, or obvious scleral/conjunctival abnormalities.  RESP: No audible wheeze, cough, or visible cyanosis.    SKIN: Visible skin clear. No significant rash, abnormal pigmentation or lesions.  NEURO: Cranial nerves grossly intact.  Mentation and speech appropriate for age.  PSYCH: Appropriate affect, tone, and pace of words          Sincerely,    MARY GRACE MayesC    The longitudinal plan of care for the diagnosis(es)/condition(s) as documented were addressed during this visit. Due to the added complexity in care, I will continue to support Sheila in the subsequent management and with ongoing continuity of care.    "

## 2024-06-03 NOTE — PROGRESS NOTES
Return Bariatric Surgery Note    RE: Sheila Vazquez  MR#: [id number]  : 1985  VISIT DATE: Remi 3, 2024      Dear Clinic, [facility],    I had the pleasure of seeing your patient, Sheila Vazquez, in my post-bariatric surgery assessment clinic.    Assessment & Plan   Problem List Items Addressed This Visit       Class 1 obesity with serious comorbidity and body mass index (BMI) of 33.0 to 33.9 in adult, unspecified obesity type - Primary    Relevant Medications    Semaglutide-Weight Management (WEGOVY) 1 MG/0.5ML pen        Increase Wegovy 1.0mg once weekly   Start taking Omeprazole 20mg once daily to see if helps with GERD symptoms and abdominal pain. No refills needed.   Hair loss - can try Biotin and Collagen. Hair, skin and nails vitamin could also be helpful.   Iron labs to be repeated today. Will reach out if another iron infusion again. Continue oral iron supplement   Anabella, Esther or Leatha in 3 months   Candace Harrington PA-C in 6 months       35 minutes spent by me on the date of the encounter doing chart review, history and exam, documentation and further activities per the note    CHIEF COMPLAINT: Post-bariatric surgery follow-up.    HISTORY OF PRESENT ILLNESS:      6/3/2024     1:35 PM   Questions Regarding Prior Weight Loss Surgery Reviewed With Patient   I had the following weight loss procedure Sleeve Gastrectomy   What year was your surgery?    How has your weight changed since your last visit? I have gained weight   Do you currently have any of the following Diabetes    Heartburn, acid reflux, or GERD (acid reflux disease)?    Hypertension (high blood pressure)?    Hyperlipidemia (high cholesterol, triglycerides)?   Do you have any concerns today? none     S/p sleeve in  in [facility].   Weight prior to surgery - 118kg - 260lb  Nikolay weight - 80kg - 176lb    Anti-obesity medications:     Current:   Wegovy 0.5mg - has been on this dose for 2 months. No side effects. Not helpful with weight  loss. Having hunger and sweet cravings.     Ozempic not covered needed to trail other medications first.     Recent diet changes: Eating small snacks throughout the day with 1 meal at night. Might have a snack after dinner. If sees food, feels like she needs it. Will have night time hunger as well. No changes in portion sizes. Drinking 48oz of water daily. Drinking 3 cups of coffee     Recent exercise/activity changes: Has been active with children. Will walk 3-4xweek for around 1 hour.     Recent sleep changes: stable     Vitamins/Labs: MV, Vitamin D, Fe    GERD - Will have symptoms with food - typically with food with skin or fried foods. At that time will take the Omeprazole when has symptoms.     Dysphagia - has improved overall. Had one episode last week with rice and meat. EGD completed - showed 2cm hiatail hernia otherwise was normal.     Abdominal pain - continues to have pain daily     CLAY - felt better after iron infusion, but over the past month has been having increase dizziness again.     Weight History:      6/3/2024     1:35 PM   --   What is your highest lifetime weight? 245   What is your lowest weight since surgery? (In pounds) 165     Initial Weight (lbs): 203.6 lbs  Weight: 92.9 kg (204 lb 12.8 oz)  Last Visits Weight: 92.4 kg (203 lb 9.6 oz)  Cumulative weight loss (lbs): -1.2  Weight Loss Percentage: -0.59%    Wt Readings from Last 5 Encounters:   06/03/24 92.9 kg (204 lb 12.8 oz)   02/22/24 92.4 kg (203 lb 9.6 oz)   11/26/22 95.4 kg (210 lb 6.4 oz)   03/10/20 100.7 kg (222 lb)             6/3/2024     1:35 PM   Questions Regarding Co-Morbidities and Health Concerns Reviewed With Patient   Pre-diabetes Improved   Diabetes II Improved   How many years have you had diabetes? 13   High Blood Pressure Improved   High cholesterol Improved   Heartburn/Reflux Improved   Sleep apnea Never   PCOS Never   Back pain Worsened   Joint pain Stayed the same   Lower leg swelling Never           6/3/2024      1:35 PM   Eating Habits   How many meals do you eat per day? 3   Do you snack between meals? No   How much food are you eating at each meal? 1/2 cup to 1 cup   Are you able to separate your meals and liquids by at least 30 minutes? Sometimes   Are you able to avoid liquid calories? Yes           6/3/2024     1:35 PM   Exercise Questions Reviewed With Patient   How often do you exercise? Less than 1 time per week   What is the duration of your exercise (in minutes)? 60+ Minutes   What types of exercise do you do? walking   What keeps you from being more active? I should be more active but I just have not gotten around to it    My ability to walk or move around is limited    Too tired       Social History:      6/3/2024     1:35 PM   --   Are you smoking? No   Are you drinking alcohol? No       Medications:  Current Outpatient Medications   Medication Sig Dispense Refill    Blood Glucose Monitoring Suppl (ACCU-CHEK GUIDE) w/Device KIT       BYDUREON BCISE 2 MG/0.85ML auto-injector INJECT 2 MG SUBCUTANEOUSLY ONCE WEEKLY      ferrous gluconate (FERGON) 324 (38 Fe) MG tablet Take 1 tablet (324 mg) by mouth 2 times daily (with meals) 60 tablet 0    metFORMIN (GLUCOPHAGE XR) 500 MG 24 hr tablet Take 500 mg by mouth      Multiple Vitamins-Minerals (BARIATRIC MULTIVITAMINS/IRON) CAPS Take 1 capsule by mouth 2 times daily 180 capsule 3    omeprazole (PRILOSEC) 20 MG DR capsule Take 1 capsule (20 mg) by mouth daily 30 capsule 3    polyethylene glycol (MIRALAX) 17 GM/Dose powder Take 17 g (1 Capful) by mouth daily 578 g 3    prednisoLONE acetate (PRED FORTE) 1 % ophthalmic suspension PLEASE SEE ATTACHED FOR DETAILED DIRECTIONS      Semaglutide-Weight Management (WEGOVY) 0.5 MG/0.5ML pen Inject 0.5 mg Subcutaneous once a week After completing 4 weeks of 0.25mg dose 2 mL 2    Semaglutide-Weight Management (WEGOVY) 1 MG/0.5ML pen Inject 1 mg Subcutaneous once a week 2 mL 3    vitamin D3 (CHOLECALCIFEROL) 1.25 MG (81104 UT) capsule  Take 50,000 Units by mouth       No current facility-administered medications for this visit.         6/3/2024     1:35 PM   --   Do you avoid NSAIDs such as (Ibuprofen, Aleve, Naproxen, Advil)? Yes       ROS:  GI:       6/3/2024     1:35 PM   --   Vomiting Yes   Diarrhea No   Constipation Yes   Swallowing trouble Yes   Abdominal pain Yes   Heartburn Yes     Skin:       6/3/2024     1:35 PM   BAR RBS ROS - SKIN   Rash in skin folds No     Psych:       6/3/2024     1:35 PM   --   Depression No   Anxiety Yes     Female Only:       6/3/2024     1:35 PM   BAR RBS ROS -    Female only None of the above   Stress urinary incontinence No       Admission on 03/06/2024, Discharged on 03/06/2024   Component Date Value Ref Range Status    GLUCOSE BY METER POCT 03/06/2024 97  70 - 99 mg/dL Final    Upper GI Endoscopy 03/06/2024    Final                    Value:[address] [place] [address] [phone]     Endoscopy Department  _______________________________________________________________________________  Patient Name: Sheila Vazquez            Procedure Date: 3/6/2024 7:42 AM  MRN: [id number]                       Account Number: [id number]  YOB: 1985               Admit Type: Outpatient  Age: 39                               Room: [place] [address]  Gender: Female                        Note Status: Finalized  Attending MD: HERBER BROWN , ,    Total Sedation Time:   _______________________________________________________________________________     Procedure:             Upper GI endoscopy  Indications:           Diagnostic procedure, Epigastric abdominal pain,                          history of bariatric surgery in 2021, n/a  Providers:             HERBER BROWN Lary Peralta, CHRIS, Carlotta Zapata RN  Referring MD:          EDWARD KENNEDY  Medicines:             Fentanyl 75 micrograms IV,  Midazolam 3 mg IV  Complications:         No immediate complications.  _______________________________________________________________________________  Procedure:             Pre-Anesthesia Assessment:                         - Prior to the procedure, a History and Physical was                          performed, and patient medications and allergies were                          reviewed. The patient is competent. The risks and                          benefits of the procedure and the sedation options and                          risks were discussed with the patient. All questions                          were answered and informed consent was obtained.                          Patient identification and proposed procedure were                          verified by the physician in the pre-procedure area.                          Mental Status Examination: alert and oriented.                          Prophylactic Antibiotics: The p                          atient does not require                          prophylactic antibiotics. Prior Anticoagulants: The                          patient has taken no anticoagulant or antiplatelet                          agents. ASA Grade Assessment: II - A patient with mild                          systemic disease. After reviewing the risks and                          benefits, the patient was deemed in satisfactory                          condition to undergo the procedure. The anesthesia                          plan was to use moderate sedation / analgesia                          (conscious sedation). Immediately prior to                          administration of medications, the patient was                          re-assessed for adequacy to receive sedatives. The                          heart rate, respiratory rate, oxygen saturations,                          blood pressure, adequacy of pulmonary ventilation, and                          response to care were  monitored throughout the                                                    procedure. The physical status of the patient was                          re-assessed after the procedure.                         After obtaining informed consent, the endoscope was                          passed under direct vision. Throughout the procedure,                          the patient's blood pressure, pulse, and oxygen                          saturations were monitored continuously. The Endoscope                          was introduced through the mouth, and advanced to the                          second part of duodenum. The upper GI endoscopy was                          accomplished with ease. The patient tolerated the                          procedure well.                                                                                   Findings:       No endoscopic abnormality was evident in the esophagus to explain the        patient's complaint of dysphagia.       A 2 cm hiatal hernia was present.       consistent with prior sleeve gastrecto                          my anatomy. one visible staple.        very mild tortuosity.       A 2 cm hiatal hernia was present. visible on retroflexion       The in the duodenum was normal.                                                                                   Impression:            - No endoscopic esophageal abnormality to explain                          patient's dysphagia.                         - 2 cm hiatal hernia.                         - 2 cm hiatal hernia.                         - Normal.                         - No specimens collected.  Recommendation:        - The patient will be observed post-procedure, until                          all discharge criteria are met.                                                                                     ___________________  HERBER BROWN,   3/6/2024 9:24:35 AM  Number of Addenda: 0    Note Initiated On:  "3/6/2024 7:42 AM  Scope In:  Scope Out:       Age less than 45, no DEXA indicated.          Objective    /70 (BP Location: Left arm, Patient Position: Sitting, Cuff Size: Adult Large)   Pulse 68   Ht 1.651 m (5' 5\")   Wt 92.9 kg (204 lb 12.8 oz)   SpO2 97%   BMI 34.08 kg/m      Physical Exam   GENERAL: alert and no distress  EYES: Eyes grossly normal to inspection.  No discharge or erythema, or obvious scleral/conjunctival abnormalities.  RESP: No audible wheeze, cough, or visible cyanosis.    SKIN: Visible skin clear. No significant rash, abnormal pigmentation or lesions.  NEURO: Cranial nerves grossly intact.  Mentation and speech appropriate for age.  PSYCH: Appropriate affect, tone, and pace of words          Sincerely,    Candace Rodriguez PA-C    The longitudinal plan of care for the diagnosis(es)/condition(s) as documented were addressed during this visit. Due to the added complexity in care, I will continue to support Sheila in the subsequent management and with ongoing continuity of care.    "

## 2024-06-05 ENCOUNTER — TELEPHONE (OUTPATIENT)
Dept: ENDOCRINOLOGY | Facility: CLINIC | Age: 39
End: 2024-06-05
Payer: COMMERCIAL

## 2024-06-05 NOTE — TELEPHONE ENCOUNTER
General Call    Contacts         Type Contact Phone/Fax    06/05/2024 02:48 PM CDT Phone (Incoming) Sheila Vazquez (Self) [phone] (M)          Reason for Call:  lab results    What are your questions or concerns:  pt is checking on lab results    Could we send this information to you in [facility] [place] or would you prefer to receive a phone call?:   Patient would prefer a phone call   Okay to leave a detailed message?: Yes at Cell number on file:    Telephone Information:   Mobile [phone]

## 2024-06-06 ENCOUNTER — TELEPHONE (OUTPATIENT)
Dept: ENDOCRINOLOGY | Facility: CLINIC | Age: 39
End: 2024-06-06
Payer: COMMERCIAL

## 2024-06-06 NOTE — TELEPHONE ENCOUNTER
Patient states she started her menses 3 days ago and is experiencing a heavy flow.  Labs from June 3rd show normal labs for ferritin, hgb, and iron.  Patient states she has some dizziness with position changes and has had some nausea.  Discussed with pt to follow up with PCP or OB/Gyn regarding heavy periods.  Patient can also be seen in UC for assessment.      Candace Rodriguez PA-C notified of patient status.  Agrees with plan for follow up. Patient to stay on iron supplement.

## 2024-06-06 NOTE — TELEPHONE ENCOUNTER
General Call    Contacts         Type Contact Phone/Fax    06/06/2024 03:03 PM CDT Phone (Incoming) Sheila Vazquez (Self) [phone] (M)          Reason for Call:  Pt needs call from provider or nurse, not feeling. Please call today.      Could we send this information to you in [place] or would you prefer to receive a phone call?:   Patient would prefer a phone call   Okay to leave a detailed message?: Yes at Cell number on file:    Telephone Information:   Mobile [phone]

## 2024-07-14 ENCOUNTER — HEALTH MAINTENANCE LETTER (OUTPATIENT)
Age: 39
End: 2024-07-14

## 2024-08-01 ENCOUNTER — TELEPHONE (OUTPATIENT)
Dept: ENDOCRINOLOGY | Facility: CLINIC | Age: 39
End: 2024-08-01
Payer: COMMERCIAL

## 2024-08-01 NOTE — TELEPHONE ENCOUNTER
Patient confirmed scheduled appointment:  Date: 12/2/24  Time: 2:30 pm  Visit type: RET RYANN  Provider: Candace Rodriguez  Location: [facility] 4th floor  Testing/imaging: n/a  Additional notes: n/a

## 2024-08-09 ENCOUNTER — TELEPHONE (OUTPATIENT)
Dept: ENDOCRINOLOGY | Facility: CLINIC | Age: 39
End: 2024-08-09
Payer: COMMERCIAL

## 2024-08-09 NOTE — TELEPHONE ENCOUNTER
PA Initiation    Medication: WEGOVY 1 MG/0.5ML SC SOAJ  Insurance Company: [employer] - Phone [phone] Fax [phone]  Pharmacy Filling the Rx: [facility] - [place], [place] - [address]  Filling Pharmacy Phone:    Filling Pharmacy Fax:    Start Date: 8/9/2024    Key: [id number]

## 2024-08-12 NOTE — TELEPHONE ENCOUNTER
Called patient. She doesn't have a home scale. Will come in tomorrow 8/13/24 for a nurse visit weight check. If more than 5% TBW loss, will submit appeal.

## 2024-08-12 NOTE — TELEPHONE ENCOUNTER
PRIOR AUTHORIZATION DENIED    Medication: WEGOVY 1 MG/0.5ML SC SOAJ  Insurance Company: [employer] - Phone [phone] Fax [phone]  Denial Date: 8/12/2024  Denial Reason(s):   Appeal Information: [phone] fax [phone]  Patient Notified:     Pt did not maintain a 5% body weight reduction.  Would you like to appeal

## 2024-08-13 ENCOUNTER — ALLIED HEALTH/NURSE VISIT (OUTPATIENT)
Dept: ENDOCRINOLOGY | Facility: CLINIC | Age: 39
End: 2024-08-13
Payer: COMMERCIAL

## 2024-08-13 VITALS
DIASTOLIC BLOOD PRESSURE: 76 MMHG | SYSTOLIC BLOOD PRESSURE: 110 MMHG | HEART RATE: 91 BPM | BODY MASS INDEX: 31.53 KG/M2 | OXYGEN SATURATION: 100 % | WEIGHT: 196.2 LBS | HEIGHT: 66 IN

## 2024-08-13 DIAGNOSIS — E66.811 CLASS 1 OBESITY WITH SERIOUS COMORBIDITY AND BODY MASS INDEX (BMI) OF 33.0 TO 33.9 IN ADULT, UNSPECIFIED OBESITY TYPE: ICD-10-CM

## 2024-08-13 DIAGNOSIS — E11.9 TYPE 2 DIABETES MELLITUS WITHOUT COMPLICATION, WITHOUT LONG-TERM CURRENT USE OF INSULIN (H): ICD-10-CM

## 2024-08-13 DIAGNOSIS — Z90.3 S/P GASTRIC SLEEVE PROCEDURE: Primary | ICD-10-CM

## 2024-08-13 PROCEDURE — 99207 PR NO CHARGE NURSE ONLY: CPT

## 2024-08-13 RX ORDER — SEMAGLUTIDE 1.7 MG/.75ML
1.7 INJECTION, SOLUTION SUBCUTANEOUS WEEKLY
Qty: 3 ML | Refills: 3 | Status: SHIPPED | OUTPATIENT
Start: 2024-08-13

## 2024-08-13 NOTE — NURSING NOTE
"No chief complaint on file.      Vitals:    08/13/24 1100   BP: 110/76   BP Location: Left arm   Patient Position: Chair   Cuff Size: Adult Regular   Pulse: 91   SpO2: 100%   Weight: 89 kg (196 lb 3.2 oz)   Height: 1.664 m (5' 5.5\")       Body mass index is 32.15 kg/m .                              "

## 2024-08-13 NOTE — TELEPHONE ENCOUNTER
"Estimated body mass index is 32.15 kg/m  as calculated from the following:    Height as of 8/13/24: 1.664 m (5' 5.5\").    Weight as of 8/13/24: 89 kg (196 lb 3.2 oz).    "

## 2024-09-10 ENCOUNTER — OFFICE VISIT (OUTPATIENT)
Dept: ENDOCRINOLOGY | Facility: CLINIC | Age: 39
End: 2024-09-10
Payer: COMMERCIAL

## 2024-09-10 VITALS
WEIGHT: 196.8 LBS | DIASTOLIC BLOOD PRESSURE: 84 MMHG | OXYGEN SATURATION: 100 % | BODY MASS INDEX: 32.79 KG/M2 | SYSTOLIC BLOOD PRESSURE: 129 MMHG | HEIGHT: 65 IN | HEART RATE: 77 BPM

## 2024-09-10 DIAGNOSIS — E11.9 TYPE 2 DIABETES MELLITUS WITHOUT COMPLICATION, WITHOUT LONG-TERM CURRENT USE OF INSULIN (H): Chronic | ICD-10-CM

## 2024-09-10 DIAGNOSIS — K21.9 GASTROESOPHAGEAL REFLUX DISEASE, UNSPECIFIED WHETHER ESOPHAGITIS PRESENT: ICD-10-CM

## 2024-09-10 DIAGNOSIS — Z90.3 S/P GASTRIC SLEEVE PROCEDURE: ICD-10-CM

## 2024-09-10 DIAGNOSIS — E66.811 CLASS 1 OBESITY WITH SERIOUS COMORBIDITY AND BODY MASS INDEX (BMI) OF 33.0 TO 33.9 IN ADULT, UNSPECIFIED OBESITY TYPE: Primary | ICD-10-CM

## 2024-09-10 PROCEDURE — 99417 PROLNG OP E/M EACH 15 MIN: CPT | Performed by: NURSE PRACTITIONER

## 2024-09-10 PROCEDURE — 99215 OFFICE O/P EST HI 40 MIN: CPT | Performed by: NURSE PRACTITIONER

## 2024-09-10 RX ORDER — FAMOTIDINE 40 MG/1
40 TABLET, FILM COATED ORAL DAILY
Qty: 90 TABLET | Refills: 3 | Status: SHIPPED | OUTPATIENT
Start: 2024-09-10 | End: 2024-09-10

## 2024-09-10 RX ORDER — FAMOTIDINE 40 MG/1
40 TABLET, FILM COATED ORAL DAILY
Qty: 90 TABLET | Refills: 3 | Status: SHIPPED | OUTPATIENT
Start: 2024-09-10

## 2024-09-10 ASSESSMENT — PAIN SCALES - GENERAL: PAINLEVEL: SEVERE PAIN (6)

## 2024-09-10 NOTE — PROGRESS NOTES
Return Bariatric Surgery Note    RE: Sheila Vazquez  MR#: [id number]  : 1985  VISIT DATE: Sep 10, 2024      Dear Clinic, [facility],    I had the pleasure of seeing your patient, Sheila Vazquez, in my post-bariatric surgery assessment clinic.    Assessment & Plan   Problem List Items Addressed This Visit          Digestive    Class 1 obesity with serious comorbidity and body mass index (BMI) of 33.0 to 33.9 in adult, unspecified obesity type - Primary     Does not believe reflux is worse since starting wegovy. However, she notes worsening symptoms in the last 4-5 months and that fits timeline of being on wegovy. Discussed options including reducing dose, changing to zepbound, trial off med to see how she feels completely off. I'd recommend trial off or switch to zepbound given her symptoms. She is very resistant to stopping wegovy as she is concerned that her cravings will come back and feels her body has gotten used to wegovy.     Concerned that patient is not getting enough protein or fluids. Discussed inadequate protein and hydration will make reflux and nausea worse while taking wegovy. Stressed regular meals and adequate protein/ hydration.     Take omeprazole twice daily - on an empty stomach   Take pepcid once daily   Avoid fried food   Increase protein - goal is 60g daily   Increase fluids - 60g minimum   3 meals a day   If nausea/ vomiting worse let me know to get wegovy 1mg instead of 1.7mg   May need to consider switching to zepbound in future   Follow up 3 months with Candace Rodriguez PA-C           Relevant Medications    famotidine (PEPCID) 40 MG tablet    omeprazole (PRILOSEC) 20 MG DR capsule    Gastroesophageal reflux disease, unspecified whether esophagitis present    Relevant Medications    famotidine (PEPCID) 40 MG tablet    omeprazole (PRILOSEC) 20 MG DR capsule       Endocrine    Type 2 diabetes mellitus without complication, without long-term current use of insulin (H)  (Chronic)       Other    S/P gastric sleeve procedure     Main concern today is epigastric pain and reflux. Reports symptoms are worse after eating and at night. Specific triggers are greasy foods, bread, rice, pasta, and fufu. However, states she also struggles with red meat and chicken. Has started doing lighter meal in the evening which has been helpful. Discussed common trigger foods post sleeve and with GLp1 medications to include high sugar foods, greasy fried foods, high carb foods (bread, rice, pasta).     Last EGD showed 2cm HH. Reviewed anatomy of hiatal hernia and risks/ benefits of fixing hernia. Discussed recommended diet modifications with hiatal hernia including smaller portions.     Taking omeprazole twice daily- with food. Discussed needing to take omeprazole on an empty stomach. Will add famotidine.          Relevant Medications    famotidine (PEPCID) 40 MG tablet    omeprazole (PRILOSEC) 20 MG DR capsule          57 minutes spent by me on the date of the encounter doing chart review, history and exam, documentation and further activities per the note    CHIEF COMPLAINT: Post-bariatric surgery follow-up. S/p sleeve in 2021 in [facility].   Weight prior to surgery - 118kg - 260lb  Nikolay weight - 80kg - 176lb    NRB 2/2024 with Candace Rodriguez PA-C  BMI 33.8 with DMII, already on metformin and bydureon. Concerns about epigastric pain.     EGD with Dr. Bryan - 2cm HH. Omeprazole       HISTORY OF PRESENT ILLNESS:      9/10/2024     2:38 PM   Questions Regarding Prior Weight Loss Surgery Reviewed With Patient   I had the following weight loss procedure Sleeve    What year was your surgery? 2021   How has your weight changed since your last visit? I have gained weight   Do you currently have any of the following Diabetes    Heartburn, acid reflux, or GERD (acid reflux disease)?    Hypertension (high blood pressure)?    Hyperlipidemia (high cholesterol, triglycerides)?   Do you have any concerns today? abd paib          Anti-obesity medications:     Current:   Metformin  Wegovy     Recent diet changes:   Sometimes breakfast   Always lunch  Dinner light   Lots of food intolerances     -Protein- needs improvement   -Water- needs improvement     GERD symptoms:   Worse after eating. Worse at night. With any food triggers- feels like food is going to come back up. 2-3 times a week will vomit because pain is too much.     Weight History:      9/10/2024     2:38 PM   --   What is your highest lifetime weight? 245   What is your lowest weight since surgery? (In pounds) 196     Initial Weight (lbs): 203.6 lbs  Weight: 89.3 kg (196 lb 12.8 oz)  Last Visits Weight: 92.5 kg (204 lb)  Cumulative weight loss (lbs): 6.8  Weight Loss Percentage: 3.34%  Waist Circumference (cm): 110 cm        9/10/2024     2:38 PM   Questions Regarding Co-Morbidities and Health Concerns Reviewed With Patient   Pre-diabetes Improved   Diabetes II Improved   How many years have you had diabetes? 14   High Blood Pressure Improved   High cholesterol Stayed the same   Heartburn/Reflux Stayed the same   Sleep apnea Never   PCOS Improved   Back pain Improved   Joint pain Improved   Lower leg swelling Never           9/10/2024     2:38 PM   Eating Habits   How many meals do you eat per day? 3   Do you snack between meals? No   How much food are you eating at each meal? 1/2 cup to 1 cup   Are you able to separate your meals and liquids by at least 30 minutes? Yes   Are you able to avoid liquid calories? Yes           9/10/2024     2:38 PM   Exercise Questions Reviewed With Patient   How often do you exercise? Never   What keeps you from being more active? Lack of Time    Too tired       Social History:      9/10/2024     2:38 PM   --   Are you smoking? No   Are you drinking alcohol? No       Medications:  Current Outpatient Medications   Medication Sig Dispense Refill    Blood Glucose Monitoring Suppl (ACCU-CHEK GUIDE) w/Device KIT       famotidine (PEPCID) 40 MG  tablet Take 1 tablet (40 mg) by mouth daily. 90 tablet 3    ferrous gluconate (FERGON) 324 (38 Fe) MG tablet Take 1 tablet (324 mg) by mouth 2 times daily (with meals) 60 tablet 0    metFORMIN (GLUCOPHAGE XR) 500 MG 24 hr tablet Take 500 mg by mouth      Multiple Vitamins-Minerals (BARIATRIC MULTIVITAMINS/IRON) CAPS Take 1 capsule by mouth 2 times daily 180 capsule 3    omeprazole (PRILOSEC) 20 MG DR capsule Take 1 capsule (20 mg) by mouth 2 times daily. 60 capsule 3    polyethylene glycol (MIRALAX) 17 GM/Dose powder Take 17 g (1 Capful) by mouth daily 578 g 3    prednisoLONE acetate (PRED FORTE) 1 % ophthalmic suspension PLEASE SEE ATTACHED FOR DETAILED DIRECTIONS      Semaglutide-Weight Management (WEGOVY) 1 MG/0.5ML pen Inject 1 mg Subcutaneous once a week 2 mL 3    Semaglutide-Weight Management (WEGOVY) 1.7 MG/0.75ML pen Inject 1.7 mg subcutaneously once a week 3 mL 3    vitamin D3 (CHOLECALCIFEROL) 1.25 MG (15843 UT) capsule Take 50,000 Units by mouth       No current facility-administered medications for this visit.         9/10/2024     2:38 PM   --   Do you avoid NSAIDs such as (Ibuprofen, Aleve, Naproxen, Advil)? Yes       ROS:  GI:       9/10/2024     2:38 PM   --   Vomiting Yes   Diarrhea No   Constipation No   Swallowing trouble Yes   Abdominal pain Yes   Heartburn Yes     Skin:       9/10/2024     2:38 PM   BAR RBS ROS - SKIN   Rash in skin folds No     Psych:       9/10/2024     2:38 PM   --   Depression No   Anxiety No     Female Only:       9/10/2024     2:38 PM   BAR RBS ROS -    Female only Irregular menstrual cycles   Stress urinary incontinence No       Lab on 06/03/2024   Component Date Value Ref Range Status    WBC Count 06/03/2024 5.7  4.0 - 11.0 10e3/uL Final    RBC Count 06/03/2024 5.19  3.80 - 5.20 10e6/uL Final    Hemoglobin 06/03/2024 14.4  11.7 - 15.7 g/dL Final    Hematocrit 06/03/2024 43.3  35.0 - 47.0 % Final    MCV 06/03/2024 83  78 - 100 fL Final    MCH 06/03/2024 27.7  26.5 -  "33.0 pg Final    MCHC 06/03/2024 33.3  31.5 - 36.5 g/dL Final    RDW 06/03/2024 13.5  10.0 - 15.0 % Final    Platelet Count 06/03/2024 356  150 - 450 10e3/uL Final    Ferritin 06/03/2024 44  6 - 175 ng/mL Final    Iron 06/03/2024 46  37 - 145 ug/dL Final    Iron Binding Capacity 06/03/2024 294  240 - 430 ug/dL Final    Iron Sat Index 06/03/2024 16  15 - 46 % Final              No data to display                  PHYSICAL EXAM:  Objective    /84 (BP Location: Left arm, Patient Position: Sitting, Cuff Size: Adult Regular)   Pulse 77   Ht 1.651 m (5' 5\")   Wt 89.3 kg (196 lb 12.8 oz)   SpO2 100%   BMI 32.75 kg/m    /84 (BP Location: Left arm, Patient Position: Sitting, Cuff Size: Adult Regular)   Pulse 77   Ht 1.651 m (5' 5\")   Wt 89.3 kg (196 lb 12.8 oz)   SpO2 100%   BMI 32.75 kg/m    Body mass index is 32.75 kg/m .  Physical Exam   GENERAL: alert and no distress  EYES: Eyes grossly normal to inspection.  No discharge or erythema, or obvious scleral/conjunctival abnormalities.  RESP: No audible wheeze, cough, or visible cyanosis.    SKIN: Visible skin clear. No significant rash, abnormal pigmentation or lesions.  NEURO: Cranial nerves grossly intact.  Mentation and speech appropriate for age.  PSYCH: Appropriate affect, tone, and pace of words         Sincerely,    Esther Lozada NP    "

## 2024-09-10 NOTE — NURSING NOTE
"(   Chief Complaint   Patient presents with    RECHECK     Return bariatric    )    ( Weight: 89.3 kg (196 lb 12.8 oz) )  ( Height: 165.1 cm (5' 5\") )  ( BMI (Calculated): 32.75 )  (   )  (   )  (   )  (   )  ( Waist Circumference (cm): 110 cm )  (   )    ( BP: 129/84 )  (   )  (   )  (   )  ( Pulse: 77 )  (   )  ( SpO2: 100 % )    (   Patient Active Problem List   Diagnosis    Cervical high risk HPV (human papillomavirus) test positive    Chronic idiopathic constipation    Type 2 diabetes mellitus without complication, without long-term current use of insulin (H)    Vasovagal syncope    HLD (hyperlipidemia)    Hepatic steatosis    S/P gastric sleeve procedure    Class 1 obesity with serious comorbidity and body mass index (BMI) of 33.0 to 33.9 in adult, unspecified obesity type    Gastroesophageal reflux disease, unspecified whether esophagitis present    Iron deficiency anemia due to chronic blood loss    )  (   Current Outpatient Medications   Medication Sig Dispense Refill    Blood Glucose Monitoring Suppl (ACCU-CHEK GUIDE) w/Device KIT       FERMIN BCISE 2 MG/0.85ML auto-injector INJECT 2 MG SUBCUTANEOUSLY ONCE WEEKLY      ferrous gluconate (FERGON) 324 (38 Fe) MG tablet Take 1 tablet (324 mg) by mouth 2 times daily (with meals) 60 tablet 0    metFORMIN (GLUCOPHAGE XR) 500 MG 24 hr tablet Take 500 mg by mouth      Multiple Vitamins-Minerals (BARIATRIC MULTIVITAMINS/IRON) CAPS Take 1 capsule by mouth 2 times daily 180 capsule 3    omeprazole (PRILOSEC) 20 MG DR capsule Take 1 capsule (20 mg) by mouth daily 30 capsule 3    polyethylene glycol (MIRALAX) 17 GM/Dose powder Take 17 g (1 Capful) by mouth daily 578 g 3    prednisoLONE acetate (PRED FORTE) 1 % ophthalmic suspension PLEASE SEE ATTACHED FOR DETAILED DIRECTIONS      Semaglutide-Weight Management (WEGOVY) 0.5 MG/0.5ML pen Inject 0.5 mg Subcutaneous once a week After completing 4 weeks of 0.25mg dose 2 mL 2    Semaglutide-Weight Management (WEGOVY) 1 " MG/0.5ML pen Inject 1 mg Subcutaneous once a week 2 mL 3    Semaglutide-Weight Management (WEGOVY) 1.7 MG/0.75ML pen Inject 1.7 mg subcutaneously once a week 3 mL 3    vitamin D3 (CHOLECALCIFEROL) 1.25 MG (79813 UT) capsule Take 50,000 Units by mouth      )  ( Diabetes Eval:    )    ( Pain Eval:  Severe Pain (6) )    ( Wound Eval:       )    (   History   Smoking Status    Never   Smokeless Tobacco    Never    )    ( Signed By:  Steffen Alatorre, EMT; September 10, 2024; 2:49 PM )

## 2024-09-10 NOTE — LETTER
9/10/2024       RE: Sheila Vazquez  [address]  [facility] [address]     Dear Colleague,    Thank you for referring your patient, Sheila Vazquez, to the [facility] [place] at [facility]. Please see a copy of my visit note below.    Return Bariatric Surgery Note    RE: Sheila Vazquez  MR#: [id number]  : 1985  VISIT DATE: Sep 10, 2024      Dear Clinic, [facility],    I had the pleasure of seeing your patient, Sheila Vazquez, in my post-bariatric surgery assessment clinic.    Assessment & Plan  Problem List Items Addressed This Visit          Digestive    Class 1 obesity with serious comorbidity and body mass index (BMI) of 33.0 to 33.9 in adult, unspecified obesity type - Primary     Does not believe reflux is worse since starting wegovy. However, she notes worsening symptoms in the last 4-5 months and that fits timeline of being on wegovy. Discussed options including reducing dose, changing to zepbound, trial off med to see how she feels completely off. I'd recommend trial off or switch to zepbound given her symptoms. She is very resistant to stopping wegovy as she is concerned that her cravings will come back and feels her body has gotten used to wegovy.     Concerned that patient is not getting enough protein or fluids. Discussed inadequate protein and hydration will make reflux and nausea worse while taking wegovy. Stressed regular meals and adequate protein/ hydration.     Take omeprazole twice daily - on an empty stomach   Take pepcid once daily   Avoid fried food   Increase protein - goal is 60g daily   Increase fluids - 60g minimum   3 meals a day   If nausea/ vomiting worse let me know to get wegovy 1mg instead of 1.7mg   May need to consider switching to zepbound in future   Follow up 3 months with Candace Rodriguez PA-C           Relevant Medications    famotidine (PEPCID) 40 MG tablet    omeprazole  (PRILOSEC) 20 MG DR capsule    Gastroesophageal reflux disease, unspecified whether esophagitis present    Relevant Medications    famotidine (PEPCID) 40 MG tablet    omeprazole (PRILOSEC) 20 MG DR capsule       Endocrine    Type 2 diabetes mellitus without complication, without long-term current use of insulin (H) (Chronic)       Other    S/P gastric sleeve procedure     Main concern today is epigastric pain and reflux. Reports symptoms are worse after eating and at night. Specific triggers are greasy foods, bread, rice, pasta, and fufu. However, states she also struggles with red meat and chicken. Has started doing lighter meal in the evening which has been helpful. Discussed common trigger foods post sleeve and with GLp1 medications to include high sugar foods, greasy fried foods, high carb foods (bread, rice, pasta).     Last EGD showed 2cm HH. Reviewed anatomy of hiatal hernia and risks/ benefits of fixing hernia. Discussed recommended diet modifications with hiatal hernia including smaller portions.     Taking omeprazole twice daily- with food. Discussed needing to take omeprazole on an empty stomach. Will add famotidine.          Relevant Medications    famotidine (PEPCID) 40 MG tablet    omeprazole (PRILOSEC) 20 MG DR capsule          57 minutes spent by me on the date of the encounter doing chart review, history and exam, documentation and further activities per the note    CHIEF COMPLAINT: Post-bariatric surgery follow-up. S/p sleeve in 2021 in [facility].   Weight prior to surgery - 118kg - 260lb  Nikolay weight - 80kg - 176lb    NRB 2/2024 with Candace Rodriguez PA-C  BMI 33.8 with DMII, already on metformin and bydureon. Concerns about epigastric pain.     EGD with Dr. Bryan - 2cm HH. Omeprazole       HISTORY OF PRESENT ILLNESS:      9/10/2024     2:38 PM   Questions Regarding Prior Weight Loss Surgery Reviewed With Patient   I had the following weight loss procedure Sleeve    What year was your surgery? 2021    How has your weight changed since your last visit? I have gained weight   Do you currently have any of the following Diabetes    Heartburn, acid reflux, or GERD (acid reflux disease)?    Hypertension (high blood pressure)?    Hyperlipidemia (high cholesterol, triglycerides)?   Do you have any concerns today? abd paib         Anti-obesity medications:     Current:   Metformin  Wegovy     Recent diet changes:   Sometimes breakfast   Always lunch  Dinner light   Lots of food intolerances     -Protein- needs improvement   -Water- needs improvement     GERD symptoms:   Worse after eating. Worse at night. With any food triggers- feels like food is going to come back up. 2-3 times a week will vomit because pain is too much.     Weight History:      9/10/2024     2:38 PM   --   What is your highest lifetime weight? 245   What is your lowest weight since surgery? (In pounds) 196     Initial Weight (lbs): 203.6 lbs  Weight: 89.3 kg (196 lb 12.8 oz)  Last Visits Weight: 92.5 kg (204 lb)  Cumulative weight loss (lbs): 6.8  Weight Loss Percentage: 3.34%  Waist Circumference (cm): 110 cm        9/10/2024     2:38 PM   Questions Regarding Co-Morbidities and Health Concerns Reviewed With Patient   Pre-diabetes Improved   Diabetes II Improved   How many years have you had diabetes? 14   High Blood Pressure Improved   High cholesterol Stayed the same   Heartburn/Reflux Stayed the same   Sleep apnea Never   PCOS Improved   Back pain Improved   Joint pain Improved   Lower leg swelling Never           9/10/2024     2:38 PM   Eating Habits   How many meals do you eat per day? 3   Do you snack between meals? No   How much food are you eating at each meal? 1/2 cup to 1 cup   Are you able to separate your meals and liquids by at least 30 minutes? Yes   Are you able to avoid liquid calories? Yes           9/10/2024     2:38 PM   Exercise Questions Reviewed With Patient   How often do you exercise? Never   What keeps you from being more  active? Lack of Time    Too tired       Social History:      9/10/2024     2:38 PM   --   Are you smoking? No   Are you drinking alcohol? No       Medications:  Current Outpatient Medications   Medication Sig Dispense Refill     Blood Glucose Monitoring Suppl (ACCU-CHEK GUIDE) w/Device KIT        famotidine (PEPCID) 40 MG tablet Take 1 tablet (40 mg) by mouth daily. 90 tablet 3     ferrous gluconate (FERGON) 324 (38 Fe) MG tablet Take 1 tablet (324 mg) by mouth 2 times daily (with meals) 60 tablet 0     metFORMIN (GLUCOPHAGE XR) 500 MG 24 hr tablet Take 500 mg by mouth       Multiple Vitamins-Minerals (BARIATRIC MULTIVITAMINS/IRON) CAPS Take 1 capsule by mouth 2 times daily 180 capsule 3     omeprazole (PRILOSEC) 20 MG DR capsule Take 1 capsule (20 mg) by mouth 2 times daily. 60 capsule 3     polyethylene glycol (MIRALAX) 17 GM/Dose powder Take 17 g (1 Capful) by mouth daily 578 g 3     prednisoLONE acetate (PRED FORTE) 1 % ophthalmic suspension PLEASE SEE ATTACHED FOR DETAILED DIRECTIONS       Semaglutide-Weight Management (WEGOVY) 1 MG/0.5ML pen Inject 1 mg Subcutaneous once a week 2 mL 3     Semaglutide-Weight Management (WEGOVY) 1.7 MG/0.75ML pen Inject 1.7 mg subcutaneously once a week 3 mL 3     vitamin D3 (CHOLECALCIFEROL) 1.25 MG (51783 UT) capsule Take 50,000 Units by mouth       No current facility-administered medications for this visit.         9/10/2024     2:38 PM   --   Do you avoid NSAIDs such as (Ibuprofen, Aleve, Naproxen, Advil)? Yes       ROS:  GI:       9/10/2024     2:38 PM   --   Vomiting Yes   Diarrhea No   Constipation No   Swallowing trouble Yes   Abdominal pain Yes   Heartburn Yes     Skin:       9/10/2024     2:38 PM   BAR RBS ROS - SKIN   Rash in skin folds No     Psych:       9/10/2024     2:38 PM   --   Depression No   Anxiety No     Female Only:       9/10/2024     2:38 PM   BAR RBS ROS -    Female only Irregular menstrual cycles   Stress urinary incontinence No       Lab on  "06/03/2024   Component Date Value Ref Range Status     WBC Count 06/03/2024 5.7  4.0 - 11.0 10e3/uL Final     RBC Count 06/03/2024 5.19  3.80 - 5.20 10e6/uL Final     Hemoglobin 06/03/2024 14.4  11.7 - 15.7 g/dL Final     Hematocrit 06/03/2024 43.3  35.0 - 47.0 % Final     MCV 06/03/2024 83  78 - 100 fL Final     MCH 06/03/2024 27.7  26.5 - 33.0 pg Final     MCHC 06/03/2024 33.3  31.5 - 36.5 g/dL Final     RDW 06/03/2024 13.5  10.0 - 15.0 % Final     Platelet Count 06/03/2024 356  150 - 450 10e3/uL Final     Ferritin 06/03/2024 44  6 - 175 ng/mL Final     Iron 06/03/2024 46  37 - 145 ug/dL Final     Iron Binding Capacity 06/03/2024 294  240 - 430 ug/dL Final     Iron Sat Index 06/03/2024 16  15 - 46 % Final              No data to display                  PHYSICAL EXAM:  Objective   /84 (BP Location: Left arm, Patient Position: Sitting, Cuff Size: Adult Regular)   Pulse 77   Ht 1.651 m (5' 5\")   Wt 89.3 kg (196 lb 12.8 oz)   SpO2 100%   BMI 32.75 kg/m    /84 (BP Location: Left arm, Patient Position: Sitting, Cuff Size: Adult Regular)   Pulse 77   Ht 1.651 m (5' 5\")   Wt 89.3 kg (196 lb 12.8 oz)   SpO2 100%   BMI 32.75 kg/m    Body mass index is 32.75 kg/m .  Physical Exam   GENERAL: alert and no distress  EYES: Eyes grossly normal to inspection.  No discharge or erythema, or obvious scleral/conjunctival abnormalities.  RESP: No audible wheeze, cough, or visible cyanosis.    SKIN: Visible skin clear. No significant rash, abnormal pigmentation or lesions.  NEURO: Cranial nerves grossly intact.  Mentation and speech appropriate for age.  PSYCH: Appropriate affect, tone, and pace of words         Sincerely,    Esther Lozada NP      Again, thank you for allowing me to participate in the care of your patient.      Sincerely,    Esther Lozada NP    "

## 2024-09-10 NOTE — PATIENT INSTRUCTIONS
Take omeprazole twice daily - on an empty stomach   Take pepcid once daily   Avoid fried food   Increase protein - goal is 60g daily   Increase fluids - 60g minimum   3 meals a day   If nausea/ vomiting worse let me know to get wegovy 1mg instead of 1.7mg   May need to consider switching to zepbound in future   Follow up 3 months with Candace Rodriguez PA-C

## 2024-09-11 NOTE — ASSESSMENT & PLAN NOTE
Does not believe reflux is worse since starting wegovy. However, she notes worsening symptoms in the last 4-5 months and that fits timeline of being on wegovy. Discussed options including reducing dose, changing to zepbound, trial off med to see how she feels completely off. I'd recommend trial off or switch to zepbound given her symptoms. She is very resistant to stopping wegovy as she is concerned that her cravings will come back and feels her body has gotten used to wegovy.     Concerned that patient is not getting enough protein or fluids. Discussed inadequate protein and hydration will make reflux and nausea worse while taking wegovy. Stressed regular meals and adequate protein/ hydration.     Take omeprazole twice daily - on an empty stomach   Take pepcid once daily   Avoid fried food   Increase protein - goal is 60g daily   Increase fluids - 60g minimum   3 meals a day   If nausea/ vomiting worse let me know to get wegovy 1mg instead of 1.7mg   May need to consider switching to zepbound in future   Follow up 3 months with Candace Rodriguez PA-C

## 2024-09-24 ENCOUNTER — TELEPHONE (OUTPATIENT)
Dept: ENDOCRINOLOGY | Facility: CLINIC | Age: 39
End: 2024-09-24
Payer: COMMERCIAL

## 2024-09-24 DIAGNOSIS — E66.811 CLASS 1 OBESITY WITH SERIOUS COMORBIDITY AND BODY MASS INDEX (BMI) OF 33.0 TO 33.9 IN ADULT, UNSPECIFIED OBESITY TYPE: Primary | ICD-10-CM

## 2024-09-24 NOTE — TELEPHONE ENCOUNTER
[facility]  Fax:[phone]  Spec: [phone]  [place]: [phone]

## 2024-09-27 NOTE — TELEPHONE ENCOUNTER
PA Initiation    Medication: WEGOVY 1.7 MG/0.75ML SC SOAJ  Insurance Company: [employer] - Phone [phone] Fax [phone]  Pharmacy Filling the Rx: [facility] - [place], [place] - [address]  Filling Pharmacy Phone: [phone]  Start Date: 9/27/2024

## 2024-09-30 NOTE — TELEPHONE ENCOUNTER
PRIOR AUTHORIZATION DENIED    Medication: WEGOVY 1.7 MG/0.75ML SC SOAJ  Insurance Company: [employer] - Phone [phone] Fax [phone]  Denial Date: 9/28/2024  Denial Reason(s): Pt has not lost/maintained 5% weight loss    Appeal Information:     Patient Notified: No

## 2024-10-01 ENCOUNTER — TELEPHONE (OUTPATIENT)
Dept: ENDOCRINOLOGY | Facility: CLINIC | Age: 39
End: 2024-10-01
Payer: COMMERCIAL

## 2024-10-01 NOTE — TELEPHONE ENCOUNTER
PA Initiation    Medication: ZEPBOUND 7.5 MG/0.5ML SC SOAJ  Insurance Company: [employer] - Phone [phone] Fax [phone]  Pharmacy Filling the Rx: [id number] - [place] - [address]  Filling Pharmacy Phone:    Filling Pharmacy Fax:    Start Date: 10/1/2024    Key: [id number]

## 2024-10-01 NOTE — TELEPHONE ENCOUNTER
Prior Authorization Approval    Medication: ZEPBOUND 7.5 MG/0.5ML SC SOAJ  Authorization Effective Date: 10/1/2024  Authorization Expiration Date: 4/1/2025  Approved Dose/Quantity: 2ml per 28 days  Reference #: Key: [id number]   Insurance Company: [employer] Phone [phone] Fax [phone]  Expected CoPay: $ 0  CoPay Card Available: No    Financial Assistance Needed: no  Which Pharmacy is filling the prescription: [id number] - [place] - [address] [facility]  Pharmacy Notified: yes  Patient Notified: yes

## 2024-10-07 ENCOUNTER — TELEPHONE (OUTPATIENT)
Dept: ENDOCRINOLOGY | Facility: CLINIC | Age: 39
End: 2024-10-07
Payer: COMMERCIAL

## 2024-10-07 NOTE — TELEPHONE ENCOUNTER
Medication Question or Refill    Contacts       Contact Date/Time Type Contact Phone/Fax    10/07/2024 03:58 PM CDT Phone (Incoming) Sheila Vazquez (Self) [phone] (M)     I was just informed about the denial for Wegovy and I would like to try and appeal to continue taking/start Wegovy 1.7            What medication are you calling about (include dose and sig)?:     Semaglutide-Weight Management (WEGOVY) 1.7 MG/0.75ML pen       Preferred Pharmacy:     [facility] - [place], [place] - [address]   [address] [facility] [address]  Phone: [phone] Fax: [phone]    Controlled Substance Agreement on file:   CSA -- Patient Level:    CSA: None found at the patient level.       Who prescribed the medication?: Leatha Hanna PA-C     Do you need a refill? Yes    When did you use the medication last? 2 weeks ago    Patient offered an appointment? No    Do you have any questions or concerns?  Yes: Patient did not check her [employer] account and received the notice about the Wegovy being denied.    She would like to try and appeal the denial and continue taking Wegovy rather than switching to a 3rd medication and was concerned about the side effects from Zepbound    Would like to speak further with one of the nurses to address the concerns      Could we send this information to you in [employer] or would you prefer to receive a phone call?:   Patient would prefer a phone call   Okay to leave a detailed message?: Yes at Cell number on file:    Telephone Information:   Mobile [phone]

## 2024-10-08 NOTE — TELEPHONE ENCOUNTER
Called and left message for patient in regards to Wegovy vs Zepbound. Advised that an appeal for Wegovy would take up to 30 days and likely not be approves, as she did not show success on the medication. Zepbound was ordered and approved. Informed that side effect risk is essentially the same as Wegovy. Gave call back number to further discuss.

## 2024-12-01 ENCOUNTER — HEALTH MAINTENANCE LETTER (OUTPATIENT)
Age: 39
End: 2024-12-01

## 2025-03-15 ENCOUNTER — HEALTH MAINTENANCE LETTER (OUTPATIENT)
Age: 40
End: 2025-03-15

## 2025-05-01 ENCOUNTER — TRANSFERRED RECORDS (OUTPATIENT)
Dept: MULTI SPECIALTY CLINIC | Facility: CLINIC | Age: 40
End: 2025-05-01

## 2025-05-01 LAB — RETINOPATHY: NORMAL

## 2025-05-22 ENCOUNTER — OFFICE VISIT (OUTPATIENT)
Dept: ENDOCRINOLOGY | Facility: CLINIC | Age: 40
End: 2025-05-22
Payer: COMMERCIAL

## 2025-05-22 ENCOUNTER — LAB (OUTPATIENT)
Dept: LAB | Facility: CLINIC | Age: 40
End: 2025-05-22
Payer: COMMERCIAL

## 2025-05-22 VITALS
HEIGHT: 66 IN | DIASTOLIC BLOOD PRESSURE: 77 MMHG | HEART RATE: 87 BPM | OXYGEN SATURATION: 100 % | SYSTOLIC BLOOD PRESSURE: 108 MMHG | BODY MASS INDEX: 34.13 KG/M2 | WEIGHT: 212.4 LBS

## 2025-05-22 DIAGNOSIS — E66.811 CLASS 1 OBESITY WITH SERIOUS COMORBIDITY AND BODY MASS INDEX (BMI) OF 33.0 TO 33.9 IN ADULT, UNSPECIFIED OBESITY TYPE: ICD-10-CM

## 2025-05-22 DIAGNOSIS — Z90.3 S/P GASTRIC SLEEVE PROCEDURE: ICD-10-CM

## 2025-05-22 DIAGNOSIS — E11.9 TYPE 2 DIABETES MELLITUS WITHOUT COMPLICATION, WITHOUT LONG-TERM CURRENT USE OF INSULIN (H): ICD-10-CM

## 2025-05-22 DIAGNOSIS — D50.0 IRON DEFICIENCY ANEMIA DUE TO CHRONIC BLOOD LOSS: ICD-10-CM

## 2025-05-22 DIAGNOSIS — K59.04 CHRONIC IDIOPATHIC CONSTIPATION: ICD-10-CM

## 2025-05-22 DIAGNOSIS — K21.9 GASTROESOPHAGEAL REFLUX DISEASE, UNSPECIFIED WHETHER ESOPHAGITIS PRESENT: ICD-10-CM

## 2025-05-22 DIAGNOSIS — E66.811 CLASS 1 OBESITY WITH SERIOUS COMORBIDITY AND BODY MASS INDEX (BMI) OF 33.0 TO 33.9 IN ADULT, UNSPECIFIED OBESITY TYPE: Primary | ICD-10-CM

## 2025-05-22 LAB
ALBUMIN SERPL BCG-MCNC: 4 G/DL (ref 3.5–5.2)
ALP SERPL-CCNC: 96 U/L (ref 40–150)
ALT SERPL W P-5'-P-CCNC: 8 U/L (ref 0–50)
ANION GAP SERPL CALCULATED.3IONS-SCNC: 11 MMOL/L (ref 7–15)
AST SERPL W P-5'-P-CCNC: 11 U/L (ref 0–45)
BILIRUB SERPL-MCNC: 0.3 MG/DL
BUN SERPL-MCNC: 10 MG/DL (ref 6–20)
CALCIUM SERPL-MCNC: 8.8 MG/DL (ref 8.8–10.4)
CHLORIDE SERPL-SCNC: 102 MMOL/L (ref 98–107)
CREAT SERPL-MCNC: 0.58 MG/DL (ref 0.51–0.95)
DACRYOCYTES BLD QL SMEAR: SLIGHT
EGFRCR SERPLBLD CKD-EPI 2021: >90 ML/MIN/1.73M2
ELLIPTOCYTES BLD QL SMEAR: SLIGHT
ERYTHROCYTE [DISTWIDTH] IN BLOOD BY AUTOMATED COUNT: 19.8 % (ref 10–15)
EST. AVERAGE GLUCOSE BLD GHB EST-MCNC: 226 MG/DL
FERRITIN SERPL-MCNC: 4 NG/ML (ref 6–175)
GLUCOSE SERPL-MCNC: 161 MG/DL (ref 70–99)
HBA1C MFR BLD: 9.5 %
HCO3 SERPL-SCNC: 23 MMOL/L (ref 22–29)
HCT VFR BLD AUTO: 28.7 % (ref 35–47)
HGB BLD-MCNC: 7.3 G/DL (ref 11.7–15.7)
IRON BINDING CAPACITY (ROCHE): 386 UG/DL (ref 240–430)
IRON SATN MFR SERPL: 4 % (ref 15–46)
IRON SERPL-MCNC: 14 UG/DL (ref 37–145)
MCH RBC QN AUTO: 14.9 PG (ref 26.5–33)
MCHC RBC AUTO-ENTMCNC: 25.4 G/DL (ref 31.5–36.5)
MCV RBC AUTO: 59 FL (ref 78–100)
PLAT MORPH BLD: ABNORMAL
PLATELET # BLD AUTO: 403 10E3/UL (ref 150–450)
POLYCHROMASIA BLD QL SMEAR: SLIGHT
POTASSIUM SERPL-SCNC: 4.1 MMOL/L (ref 3.4–5.3)
PROT SERPL-MCNC: 7.1 G/DL (ref 6.4–8.3)
PTH-INTACT SERPL-MCNC: 93 PG/ML (ref 15–65)
RBC # BLD AUTO: 4.9 10E6/UL (ref 3.8–5.2)
RBC MORPH BLD: ABNORMAL
SODIUM SERPL-SCNC: 136 MMOL/L (ref 135–145)
TARGETS BLD QL SMEAR: SLIGHT
VIT B12 SERPL-MCNC: 389 PG/ML (ref 232–1245)
VIT D+METAB SERPL-MCNC: 23 NG/ML (ref 20–50)
WBC # BLD AUTO: 6.7 10E3/UL (ref 4–11)

## 2025-05-22 PROCEDURE — 3078F DIAST BP <80 MM HG: CPT

## 2025-05-22 PROCEDURE — 1125F AMNT PAIN NOTED PAIN PRSNT: CPT

## 2025-05-22 PROCEDURE — 82306 VITAMIN D 25 HYDROXY: CPT | Performed by: NURSE PRACTITIONER

## 2025-05-22 PROCEDURE — 99214 OFFICE O/P EST MOD 30 MIN: CPT

## 2025-05-22 PROCEDURE — G2211 COMPLEX E/M VISIT ADD ON: HCPCS

## 2025-05-22 PROCEDURE — 83036 HEMOGLOBIN GLYCOSYLATED A1C: CPT | Performed by: NURSE PRACTITIONER

## 2025-05-22 PROCEDURE — 82607 VITAMIN B-12: CPT | Performed by: NURSE PRACTITIONER

## 2025-05-22 PROCEDURE — 3074F SYST BP LT 130 MM HG: CPT

## 2025-05-22 RX ORDER — OMEPRAZOLE 20 MG/1
20 CAPSULE, DELAYED RELEASE ORAL 2 TIMES DAILY
Qty: 60 CAPSULE | Refills: 3 | Status: SHIPPED | OUTPATIENT
Start: 2025-05-22

## 2025-05-22 RX ORDER — MULTIVIT-MIN/IRON/FOLIC ACID/K 45-800-120
1 CAPSULE ORAL 2 TIMES DAILY
Qty: 180 CAPSULE | Refills: 3 | Status: SHIPPED | OUTPATIENT
Start: 2025-05-22

## 2025-05-22 RX ORDER — POLYETHYLENE GLYCOL 3350 17 G/17G
1 POWDER, FOR SOLUTION ORAL DAILY
Qty: 578 G | Refills: 3 | Status: SHIPPED | OUTPATIENT
Start: 2025-05-22

## 2025-05-22 ASSESSMENT — PAIN SCALES - GENERAL: PAINLEVEL_OUTOF10: MILD PAIN (2)

## 2025-05-22 NOTE — NURSING NOTE
"(   Chief Complaint   Patient presents with    RECHECK     6 months follow-up.     )    ( Weight: 96.3 kg (212 lb 6.4 oz) )  ( Height: 166.4 cm (5' 5.5\") )  ( BMI (Calculated): 34.81 )  (   )  ( Cumulative weight loss (lbs): -8.8 )  ( Last Visits Weight: 95.1 kg (209 lb 9.6 oz) )  ( Wt change since last visit (lbs): 2.8 )  ( Waist Circumference (cm): 120 cm )  (   )    ( BP: 108/77 )  (   )  (   )  (   )  ( Pulse: 87 )  (   )  ( SpO2: 100 % )    (   Patient Active Problem List   Diagnosis    Cervical high risk HPV (human papillomavirus) test positive    Chronic idiopathic constipation    Type 2 diabetes mellitus without complication, without long-term current use of insulin (H)    Vasovagal syncope    HLD (hyperlipidemia)    Hepatic steatosis    S/P gastric sleeve procedure    Class 1 obesity with serious comorbidity and body mass index (BMI) of 33.0 to 33.9 in adult, unspecified obesity type    Gastroesophageal reflux disease, unspecified whether esophagitis present    Iron deficiency anemia due to chronic blood loss    )  (   Current Outpatient Medications   Medication Sig Dispense Refill    metFORMIN (GLUCOPHAGE XR) 500 MG 24 hr tablet Take 500 mg by mouth      Multiple Vitamins-Minerals (BARIATRIC MULTIVITAMINS/IRON) CAPS Take 1 capsule by mouth 2 times daily. 180 capsule 3    semaglutide (OZEMPIC) 2 MG/1.5ML SOPN pen Inject 0.25 mg subcutaneously once weekly for 4 weeks then 0.5 mg once weekly. 1.5 mL 2    vitamin D3 (CHOLECALCIFEROL) 1.25 MG (87372 UT) capsule Take 50,000 Units by mouth      Blood Glucose Monitoring Suppl (ACCU-CHEK GUIDE) w/Device KIT  (Patient not taking: Reported on 5/22/2025)      famotidine (PEPCID) 40 MG tablet Take 1 tablet (40 mg) by mouth daily. (Patient not taking: Reported on 5/22/2025) 90 tablet 3    ferrous gluconate (FERGON) 324 (38 Fe) MG tablet Take 1 tablet (324 mg) by mouth 2 times daily (with meals) (Patient not taking: Reported on 5/22/2025) 60 tablet 0    omeprazole " (PRILOSEC) 20 MG DR capsule Take 1 capsule (20 mg) by mouth 2 times daily. (Patient not taking: Reported on 5/22/2025) 60 capsule 3    polyethylene glycol (MIRALAX) 17 GM/Dose powder Take 17 g (1 Capful) by mouth daily (Patient not taking: Reported on 5/22/2025) 578 g 3    prednisoLONE acetate (PRED FORTE) 1 % ophthalmic suspension PLEASE SEE ATTACHED FOR DETAILED DIRECTIONS (Patient not taking: Reported on 5/22/2025)      )  ( Diabetes Eval:    )    ( Pain Eval:  Mild Pain (2) )    ( Wound Eval:       )    (   History   Smoking Status    Never   Smokeless Tobacco    Never    )    ( Signed By:  Destiny Cobos, EMT May 22, 2025; 1:12 PM )

## 2025-05-22 NOTE — PROGRESS NOTES
Return Bariatric Surgery Note    RE: Sheila Vazquez  MR#: [id number]  : 1985  VISIT DATE: May 22, 2025      Dear Clinic, [facility],    I had the pleasure of seeing your patient, Sheila Vazquez, in my post-bariatric surgery assessment clinic.    Assessment & Plan   Problem List Items Addressed This Visit       Chronic idiopathic constipation    Relevant Medications    polyethylene glycol (MIRALAX) 17 GM/Dose powder    Type 2 diabetes mellitus without complication, without long-term current use of insulin (H) (Chronic)    Relevant Medications    Multiple Vitamins-Minerals (BARIATRIC MULTIVITAMINS/IRON) CAPS    Semaglutide, 1 MG/DOSE, (OZEMPIC) 4 MG/3ML pen    S/P gastric sleeve procedure    Relevant Medications    omeprazole (PRILOSEC) 20 MG DR capsule    Multiple Vitamins-Minerals (BARIATRIC MULTIVITAMINS/IRON) CAPS    Class 1 obesity with serious comorbidity and body mass index (BMI) of 33.0 to 33.9 in adult, unspecified obesity type - Primary    Relevant Medications    omeprazole (PRILOSEC) 20 MG DR capsule    Multiple Vitamins-Minerals (BARIATRIC MULTIVITAMINS/IRON) CAPS    Semaglutide, 1 MG/DOSE, (OZEMPIC) 4 MG/3ML pen    Gastroesophageal reflux disease, unspecified whether esophagitis present    Relevant Medications    omeprazole (PRILOSEC) 20 MG DR capsule    polyethylene glycol (MIRALAX) 17 GM/Dose powder    Iron deficiency anemia due to chronic blood loss    Relevant Orders    Ferritin    Iron and iron binding capacity (Completed)      Increase Ozempic 1.0mg once weekly.   Constipation - start Miralax 1 cap daily.   GERD - Continue omeprazole 20mg twice daily. Refills sent   Anemia - Anemia labs ordered. Will order iron infusion again if needed   Start Multivitamin. This was resent to your pharmacy   Discuss headaches and back pain with primary care   Candace Harrington PA-C in 3 months       33 minutes spent by me on the date of the encounter doing chart review, history and exam, documentation and  further activities per the note    CHIEF COMPLAINT: Post-bariatric surgery follow-up.     HISTORY OF PRESENT ILLNESS:      5/22/2025     1:06 PM   Questions Regarding Prior Weight Loss Surgery Reviewed With Patient   I had the following weight loss procedure Sleeve Gastrectomy   What year was your surgery? 2021   How has your weight changed since your last visit? I have gained weight   Do you currently have any of the following Diabetes    Hyperlipidemia (high cholesterol, triglycerides)?   Do you have any concerns today? Weight gain and fainting.     S/p sleeve in 2021 in [facility].   Weight prior to surgery - 118kg - 260lb  Nikolay weight - 80kg - 176lb  Reestablished care 6/3/2024 - established care for concerns of weight regain (30lbs in the past 2 years) and new onset abdominal pain and dysphagia.   GERD - Will have symptoms with food - typically with food with skin or fried foods. At that time will take the Omeprazole when has symptoms.    Dysphagia - has improved overall. Had one episode last week with rice and meat. EGD completed - showed 2cm hiatail hernia otherwise was normal.  Type II diabetes - on metformin and bydureon. Ozempic not covered by insurance. Transitioned to Wegovy, stopped being covered due to not losing 5% tbw. Transitioned to Zepbound    Last seen by Esther Lozada CNP on 1/2025. Stopped Zepbound as was not helpful with weightloss. Did not want to restart, wanted to be on Ozempic.      Anti-obesity medications:     Current:   Ozempic 0.5mg - has been on this dose for around 3 month. Would liek to dose increase today. Is not interested in going back on Zepbound.     Type II diabetes:   Checking BS at home - fasting 207      Recent diet changes: Eating 4-5 small meals through out the day. Will eat if it is available. Has a lot of food noise. Will have physical fullness, but not mentally satisfied.     -Water? 48-64oz daily    Recent exercise/activity changes: can be limited due to back pain      Vitamins/Labs: Vitamin D. Is not taking MV - did not     GERD - avoid greasy foods which helps. Taking omeprazole 20mg BID, which is helpful. Has not been taking the pepcid.     Anemia - feels like it might be back. Passed out in the farmers market last week. Has a normal blood sugar. Heavy menstrual cycle.     Constipation - having a BM every 2-3 days. Will have tea which helps.     Weight History:      5/22/2025     1:06 PM   --   What is your highest lifetime weight? 245   What is your lowest weight since surgery? (In pounds) 169.4     Initial Weight (lbs): 203.6 lbs  Weight: 96.3 kg (212 lb 6.4 oz)  Last Visits Weight: 95.1 kg (209 lb 9.6 oz)  Cumulative weight loss (lbs): -8.8  Weight Loss Percentage: -4.32%  Waist Circumference (cm): 120 cm    Wt Readings from Last 5 Encounters:   05/22/25 96.3 kg (212 lb 6.4 oz)   01/15/25 95.1 kg (209 lb 9.6 oz)   09/10/24 89.3 kg (196 lb 12.8 oz)   08/13/24 89 kg (196 lb 3.2 oz)   06/03/24 92.9 kg (204 lb 12.8 oz)             5/22/2025     1:06 PM   Questions Regarding Co-Morbidities and Health Concerns Reviewed With Patient   Pre-diabetes Stayed the same   Diabetes II Stayed the same   High Blood Pressure Improved   High cholesterol Stayed the same   Heartburn/Reflux Improved   Sleep apnea Worsened   PCOS Improved   Back pain Worsened   Joint pain Never   Lower leg swelling Never           5/22/2025     1:06 PM   Eating Habits   How many meals do you eat per day? 2   Do you snack between meals? Yes   How much food are you eating at each meal? 1/2 cup to 1 cup   Are you able to separate your meals and liquids by at least 30 minutes? Sometimes   Are you able to avoid liquid calories? Yes           5/22/2025     1:06 PM   Exercise Questions Reviewed With Patient   How often do you exercise? Less than 1 time per week   What is the duration of your exercise (in minutes)? 60+ Minutes   What types of exercise do you do? walking   What keeps you from being more active?  Pain    My ability to walk or move around is limited    Shortness of breath    Too tired       Social History:      5/22/2025     1:06 PM   --   Are you smoking? No   Are you drinking alcohol? No       Medications:  Current Outpatient Medications   Medication Sig Dispense Refill    metFORMIN (GLUCOPHAGE XR) 500 MG 24 hr tablet Take 500 mg by mouth      Multiple Vitamins-Minerals (BARIATRIC MULTIVITAMINS/IRON) CAPS Take 1 capsule by mouth 2 times daily. 180 capsule 3    omeprazole (PRILOSEC) 20 MG DR capsule Take 1 capsule (20 mg) by mouth 2 times daily. 60 capsule 3    polyethylene glycol (MIRALAX) 17 GM/Dose powder Take 17 g (1 Capful) by mouth daily. 578 g 3    semaglutide (OZEMPIC) 2 MG/1.5ML SOPN pen Inject 0.25 mg subcutaneously once weekly for 4 weeks then 0.5 mg once weekly. 1.5 mL 2    Semaglutide, 1 MG/DOSE, (OZEMPIC) 4 MG/3ML pen Inject 1 mg subcutaneously every 7 days. 3 mL 4    vitamin D3 (CHOLECALCIFEROL) 1.25 MG (22589 UT) capsule Take 50,000 Units by mouth      Blood Glucose Monitoring Suppl (ACCU-CHEK GUIDE) w/Device KIT  (Patient not taking: Reported on 5/22/2025)      famotidine (PEPCID) 40 MG tablet Take 1 tablet (40 mg) by mouth daily. (Patient not taking: Reported on 5/22/2025) 90 tablet 3    ferrous gluconate (FERGON) 324 (38 Fe) MG tablet Take 1 tablet (324 mg) by mouth 2 times daily (with meals) (Patient not taking: Reported on 5/22/2025) 60 tablet 0    prednisoLONE acetate (PRED FORTE) 1 % ophthalmic suspension PLEASE SEE ATTACHED FOR DETAILED DIRECTIONS (Patient not taking: Reported on 5/22/2025)       No current facility-administered medications for this visit.         5/22/2025     1:06 PM   --   Do you avoid NSAIDs such as (Ibuprofen, Aleve, Naproxen, Advil)? Yes       ROS:  GI:       5/22/2025     1:06 PM   --   Vomiting Yes   Diarrhea No   Constipation Yes   Swallowing trouble No   Abdominal pain Yes   Heartburn Yes     Skin:       5/22/2025     1:06 PM   BAR RBS ROS - SKIN   Rash in  "skin folds No     Psych:       5/22/2025     1:06 PM   --   Depression No   Anxiety Yes     Female Only:       5/22/2025     1:06 PM   BAR RBS ROS -    Female only Regular menstrual cycles   Stress urinary incontinence No           Objective      /77 (BP Location: Left arm, Patient Position: Sitting, Cuff Size: Adult Large)   Pulse 87   Ht 1.664 m (5' 5.5\")   Wt 96.3 kg (212 lb 6.4 oz)   SpO2 100%   BMI 34.81 kg/m    Body mass index is 34.81 kg/m .      Physical Exam   GENERAL: alert and no distress  EYES: Eyes grossly normal to inspection.  No discharge or erythema, or obvious scleral/conjunctival abnormalities.  RESP: No audible wheeze, cough, or visible cyanosis.    SKIN: Visible skin clear. No significant rash, abnormal pigmentation or lesions.  NEURO: Cranial nerves grossly intact.  Mentation and speech appropriate for age.  PSYCH: Appropriate affect, tone, and pace of words          Sincerely,    Candace Rodriguez PA-C    The longitudinal plan of care for the diagnosis(es)/condition(s) as documented were addressed during this visit. Due to the added complexity in care, I will continue to support Sheila in the subsequent management and with ongoing continuity of care.  "

## 2025-05-22 NOTE — LETTER
2025       RE: Sheila Vazquez  [address]  [facility] [address]     Dear Colleague,    Thank you for referring your patient, Sheila Vazquez, to the [facility] [place] [facility]. Please see a copy of my visit note below.    Return Bariatric Surgery Note    RE: Sheila Vazquez  MR#: [id number]  : 1985  VISIT DATE: May 22, 2025      Dear Clinic, [facility],    I had the pleasure of seeing your patient, Sheila Vazquez, in my post-bariatric surgery assessment clinic.    Assessment & Plan  Problem List Items Addressed This Visit       Chronic idiopathic constipation    Relevant Medications    polyethylene glycol (MIRALAX) 17 GM/Dose powder    Type 2 diabetes mellitus without complication, without long-term current use of insulin (H) (Chronic)    Relevant Medications    Multiple Vitamins-Minerals (BARIATRIC MULTIVITAMINS/IRON) CAPS    Semaglutide, 1 MG/DOSE, (OZEMPIC) 4 MG/3ML pen    S/P gastric sleeve procedure    Relevant Medications    omeprazole (PRILOSEC) 20 MG DR capsule    Multiple Vitamins-Minerals (BARIATRIC MULTIVITAMINS/IRON) CAPS    Class 1 obesity with serious comorbidity and body mass index (BMI) of 33.0 to 33.9 in adult, unspecified obesity type - Primary    Relevant Medications    omeprazole (PRILOSEC) 20 MG DR capsule    Multiple Vitamins-Minerals (BARIATRIC MULTIVITAMINS/IRON) CAPS    Semaglutide, 1 MG/DOSE, (OZEMPIC) 4 MG/3ML pen    Gastroesophageal reflux disease, unspecified whether esophagitis present    Relevant Medications    omeprazole (PRILOSEC) 20 MG DR capsule    polyethylene glycol (MIRALAX) 17 GM/Dose powder    Iron deficiency anemia due to chronic blood loss    Relevant Orders    Ferritin    Iron and iron binding capacity (Completed)      Increase Ozempic 1.0mg once weekly.   Constipation - start Miralax 1 cap daily.   GERD - Continue omeprazole 20mg twice daily. Refills sent    Anemia - Anemia labs ordered. Will order iron infusion again if needed   Start Multivitamin. This was resent to your pharmacy   Discuss headaches and back pain with primary care   Candace Harrington PA-C in 3 months       33 minutes spent by me on the date of the encounter doing chart review, history and exam, documentation and further activities per the note    CHIEF COMPLAINT: Post-bariatric surgery follow-up.     HISTORY OF PRESENT ILLNESS:      5/22/2025     1:06 PM   Questions Regarding Prior Weight Loss Surgery Reviewed With Patient   I had the following weight loss procedure Sleeve Gastrectomy   What year was your surgery? 2021   How has your weight changed since your last visit? I have gained weight   Do you currently have any of the following Diabetes    Hyperlipidemia (high cholesterol, triglycerides)?   Do you have any concerns today? Weight gain and fainting.     S/p sleeve in 2021 in [facility].   Weight prior to surgery - 118kg - 260lb  Nikolay weight - 80kg - 176lb  Reestablished care 6/3/2024 - established care for concerns of weight regain (30lbs in the past 2 years) and new onset abdominal pain and dysphagia.   GERD - Will have symptoms with food - typically with food with skin or fried foods. At that time will take the Omeprazole when has symptoms.    Dysphagia - has improved overall. Had one episode last week with rice and meat. EGD completed - showed 2cm hiatail hernia otherwise was normal.  Type II diabetes - on metformin and bydureon. Ozempic not covered by insurance. Transitioned to Wegovy, stopped being covered due to not losing 5% tbw. Transitioned to Zepbound    Last seen by Esther Lozada CNP on 1/2025. Stopped Zepbound as was not helpful with weightloss. Did not want to restart, wanted to be on Ozempic.      Anti-obesity medications:     Current:   Ozempic 0.5mg - has been on this dose for around 3 month. Would liek to dose increase today. Is not interested in going back on Zepbound.     Type II diabetes:    Checking BS at home - fasting 207      Recent diet changes: Eating 4-5 small meals through out the day. Will eat if it is available. Has a lot of food noise. Will have physical fullness, but not mentally satisfied.     -Water? 48-64oz daily    Recent exercise/activity changes: can be limited due to back pain     Vitamins/Labs: Vitamin D. Is not taking MV - did not     GERD - avoid greasy foods which helps. Taking omeprazole 20mg BID, which is helpful. Has not been taking the pepcid.     Anemia - feels like it might be back. Passed out in the farmers market last week. Has a normal blood sugar. Heavy menstrual cycle.     Constipation - having a BM every 2-3 days. Will have tea which helps.     Weight History:      5/22/2025     1:06 PM   --   What is your highest lifetime weight? 245   What is your lowest weight since surgery? (In pounds) 169.4     Initial Weight (lbs): 203.6 lbs  Weight: 96.3 kg (212 lb 6.4 oz)  Last Visits Weight: 95.1 kg (209 lb 9.6 oz)  Cumulative weight loss (lbs): -8.8  Weight Loss Percentage: -4.32%  Waist Circumference (cm): 120 cm    Wt Readings from Last 5 Encounters:   05/22/25 96.3 kg (212 lb 6.4 oz)   01/15/25 95.1 kg (209 lb 9.6 oz)   09/10/24 89.3 kg (196 lb 12.8 oz)   08/13/24 89 kg (196 lb 3.2 oz)   06/03/24 92.9 kg (204 lb 12.8 oz)             5/22/2025     1:06 PM   Questions Regarding Co-Morbidities and Health Concerns Reviewed With Patient   Pre-diabetes Stayed the same   Diabetes II Stayed the same   High Blood Pressure Improved   High cholesterol Stayed the same   Heartburn/Reflux Improved   Sleep apnea Worsened   PCOS Improved   Back pain Worsened   Joint pain Never   Lower leg swelling Never           5/22/2025     1:06 PM   Eating Habits   How many meals do you eat per day? 2   Do you snack between meals? Yes   How much food are you eating at each meal? 1/2 cup to 1 cup   Are you able to separate your meals and liquids by at least 30 minutes? Sometimes   Are you able  to avoid liquid calories? Yes           5/22/2025     1:06 PM   Exercise Questions Reviewed With Patient   How often do you exercise? Less than 1 time per week   What is the duration of your exercise (in minutes)? 60+ Minutes   What types of exercise do you do? walking   What keeps you from being more active? Pain    My ability to walk or move around is limited    Shortness of breath    Too tired       Social History:      5/22/2025     1:06 PM   --   Are you smoking? No   Are you drinking alcohol? No       Medications:  Current Outpatient Medications   Medication Sig Dispense Refill     metFORMIN (GLUCOPHAGE XR) 500 MG 24 hr tablet Take 500 mg by mouth       Multiple Vitamins-Minerals (BARIATRIC MULTIVITAMINS/IRON) CAPS Take 1 capsule by mouth 2 times daily. 180 capsule 3     omeprazole (PRILOSEC) 20 MG DR capsule Take 1 capsule (20 mg) by mouth 2 times daily. 60 capsule 3     polyethylene glycol (MIRALAX) 17 GM/Dose powder Take 17 g (1 Capful) by mouth daily. 578 g 3     semaglutide (OZEMPIC) 2 MG/1.5ML SOPN pen Inject 0.25 mg subcutaneously once weekly for 4 weeks then 0.5 mg once weekly. 1.5 mL 2     Semaglutide, 1 MG/DOSE, (OZEMPIC) 4 MG/3ML pen Inject 1 mg subcutaneously every 7 days. 3 mL 4     vitamin D3 (CHOLECALCIFEROL) 1.25 MG (78763 UT) capsule Take 50,000 Units by mouth       Blood Glucose Monitoring Suppl (ACCU-CHEK GUIDE) w/Device KIT  (Patient not taking: Reported on 5/22/2025)       famotidine (PEPCID) 40 MG tablet Take 1 tablet (40 mg) by mouth daily. (Patient not taking: Reported on 5/22/2025) 90 tablet 3     ferrous gluconate (FERGON) 324 (38 Fe) MG tablet Take 1 tablet (324 mg) by mouth 2 times daily (with meals) (Patient not taking: Reported on 5/22/2025) 60 tablet 0     prednisoLONE acetate (PRED FORTE) 1 % ophthalmic suspension PLEASE SEE ATTACHED FOR DETAILED DIRECTIONS (Patient not taking: Reported on 5/22/2025)       No current facility-administered medications for this visit.          "5/22/2025     1:06 PM   --   Do you avoid NSAIDs such as (Ibuprofen, Aleve, Naproxen, Advil)? Yes       ROS:  GI:       5/22/2025     1:06 PM   --   Vomiting Yes   Diarrhea No   Constipation Yes   Swallowing trouble No   Abdominal pain Yes   Heartburn Yes     Skin:       5/22/2025     1:06 PM   BAR RBS ROS - SKIN   Rash in skin folds No     Psych:       5/22/2025     1:06 PM   --   Depression No   Anxiety Yes     Female Only:       5/22/2025     1:06 PM   BAR RBS ROS -    Female only Regular menstrual cycles   Stress urinary incontinence No           Objective     /77 (BP Location: Left arm, Patient Position: Sitting, Cuff Size: Adult Large)   Pulse 87   Ht 1.664 m (5' 5.5\")   Wt 96.3 kg (212 lb 6.4 oz)   SpO2 100%   BMI 34.81 kg/m    Body mass index is 34.81 kg/m .      Physical Exam   GENERAL: alert and no distress  EYES: Eyes grossly normal to inspection.  No discharge or erythema, or obvious scleral/conjunctival abnormalities.  RESP: No audible wheeze, cough, or visible cyanosis.    SKIN: Visible skin clear. No significant rash, abnormal pigmentation or lesions.  NEURO: Cranial nerves grossly intact.  Mentation and speech appropriate for age.  PSYCH: Appropriate affect, tone, and pace of words          Sincerely,    Candace Rodriguez PA-C    The longitudinal plan of care for the diagnosis(es)/condition(s) as documented were addressed during this visit. Due to the added complexity in care, I will continue to support Sheila in the subsequent management and with ongoing continuity of care.    Again, thank you for allowing me to participate in the care of your patient.      Sincerely,    Candace Rodriguez PA-C    "

## 2025-05-25 LAB
A-TOCOPHEROL VIT E SERPL-MCNC: 8.3 MG/L
BETA+GAMMA TOCOPHEROL SERPL-MCNC: 1.3 MG/L

## 2025-05-27 ENCOUNTER — RESULTS FOLLOW-UP (OUTPATIENT)
Dept: ENDOCRINOLOGY | Facility: CLINIC | Age: 40
End: 2025-05-27

## 2025-05-27 DIAGNOSIS — D50.0 IRON DEFICIENCY ANEMIA DUE TO CHRONIC BLOOD LOSS: ICD-10-CM

## 2025-05-27 DIAGNOSIS — E21.3 HYPERPARATHYROIDISM: Primary | ICD-10-CM

## 2025-05-27 DIAGNOSIS — E11.9 TYPE 2 DIABETES MELLITUS WITHOUT COMPLICATION, WITHOUT LONG-TERM CURRENT USE OF INSULIN (H): ICD-10-CM

## 2025-05-27 RX ORDER — METHYLPREDNISOLONE SODIUM SUCCINATE 125 MG/2ML
125 INJECTION INTRAMUSCULAR; INTRAVENOUS
Status: CANCELLED
Start: 2025-05-27

## 2025-05-27 RX ORDER — MEPERIDINE HYDROCHLORIDE 25 MG/ML
25 INJECTION INTRAMUSCULAR; INTRAVENOUS; SUBCUTANEOUS EVERY 30 MIN PRN
Status: CANCELLED | OUTPATIENT
Start: 2025-05-27

## 2025-05-27 RX ORDER — HEPARIN SODIUM,PORCINE 10 UNIT/ML
5-20 VIAL (ML) INTRAVENOUS DAILY PRN
Status: CANCELLED | OUTPATIENT
Start: 2025-05-27

## 2025-05-27 RX ORDER — HEPARIN SODIUM (PORCINE) LOCK FLUSH IV SOLN 100 UNIT/ML 100 UNIT/ML
5 SOLUTION INTRAVENOUS
Status: CANCELLED | OUTPATIENT
Start: 2025-05-27

## 2025-05-27 RX ORDER — ALBUTEROL SULFATE 90 UG/1
1-2 INHALANT RESPIRATORY (INHALATION)
Status: CANCELLED
Start: 2025-05-27

## 2025-05-27 RX ORDER — DIPHENHYDRAMINE HYDROCHLORIDE 50 MG/ML
50 INJECTION, SOLUTION INTRAMUSCULAR; INTRAVENOUS
Status: CANCELLED
Start: 2025-05-27

## 2025-05-27 RX ORDER — EPINEPHRINE 1 MG/ML
0.3 INJECTION, SOLUTION, CONCENTRATE INTRAVENOUS EVERY 5 MIN PRN
Status: CANCELLED | OUTPATIENT
Start: 2025-05-27

## 2025-05-27 RX ORDER — ALBUTEROL SULFATE 0.83 MG/ML
2.5 SOLUTION RESPIRATORY (INHALATION)
Status: CANCELLED | OUTPATIENT
Start: 2025-05-27

## 2025-05-27 NOTE — PATIENT INSTRUCTIONS
Visit Plan:     Increase Ozempic 1.0mg once weekly.   Constipation - start Miralax 1 cap daily.   GERD - Continue omeprazole 20mg twice daily. Refills sent   Anemia - Anemia labs ordered. Will order iron infusion again if needed   Start Multivitamin. This was resent to your pharmacy   Discuss headaches and back pain with primary care   Candace Harrington PA-C in 3 months

## 2025-05-29 ENCOUNTER — TELEPHONE (OUTPATIENT)
Dept: ENDOCRINOLOGY | Facility: CLINIC | Age: 40
End: 2025-05-29
Payer: COMMERCIAL

## 2025-05-29 RX ORDER — DIPHENHYDRAMINE HYDROCHLORIDE 50 MG/ML
50 INJECTION, SOLUTION INTRAMUSCULAR; INTRAVENOUS
Start: 2025-06-05

## 2025-05-29 RX ORDER — METHYLPREDNISOLONE SODIUM SUCCINATE 40 MG/ML
40 INJECTION INTRAMUSCULAR; INTRAVENOUS
Start: 2025-06-05

## 2025-05-29 RX ORDER — EPINEPHRINE 1 MG/ML
0.3 INJECTION, SOLUTION, CONCENTRATE INTRAVENOUS EVERY 5 MIN PRN
OUTPATIENT
Start: 2025-06-05

## 2025-05-29 RX ORDER — ALBUTEROL SULFATE 90 UG/1
1-2 INHALANT RESPIRATORY (INHALATION)
Start: 2025-06-05

## 2025-05-29 RX ORDER — MEPERIDINE HYDROCHLORIDE 25 MG/ML
25 INJECTION INTRAMUSCULAR; INTRAVENOUS; SUBCUTANEOUS
OUTPATIENT
Start: 2025-06-05

## 2025-05-29 RX ORDER — DIPHENHYDRAMINE HYDROCHLORIDE 50 MG/ML
25 INJECTION, SOLUTION INTRAMUSCULAR; INTRAVENOUS
Start: 2025-06-05

## 2025-05-29 RX ORDER — ALBUTEROL SULFATE 0.83 MG/ML
2.5 SOLUTION RESPIRATORY (INHALATION)
OUTPATIENT
Start: 2025-06-05

## 2025-05-29 RX ORDER — HEPARIN SODIUM (PORCINE) LOCK FLUSH IV SOLN 100 UNIT/ML 100 UNIT/ML
5 SOLUTION INTRAVENOUS
OUTPATIENT
Start: 2025-06-05

## 2025-05-29 RX ORDER — HEPARIN SODIUM,PORCINE 10 UNIT/ML
5-20 VIAL (ML) INTRAVENOUS DAILY PRN
OUTPATIENT
Start: 2025-06-05

## 2025-05-29 NOTE — TELEPHONE ENCOUNTER
General Call    Contacts       Contact Date/Time Type Contact Phone/Fax    05/29/2025 01:20 PM CDT Phone (Incoming) Sheila Vazquez (Self) [phone] (M)          Reason for Call:  call back     What are your questions or concerns:  pt would like a call back regarding what her pcp told her    Could we send this information to you in [facility] or would you prefer to receive a phone call?:   Patient would prefer a phone call   Okay to leave a detailed message?: Yes at Cell number on file:    Telephone Information:   Mobile [phone]

## 2025-06-02 ENCOUNTER — HOSPITAL ENCOUNTER (EMERGENCY)
Facility: CLINIC | Age: 40
Discharge: HOME | End: 2025-06-02
Attending: EMERGENCY MEDICINE | Admitting: EMERGENCY MEDICINE
Payer: COMMERCIAL

## 2025-06-02 VITALS
HEART RATE: 90 BPM | SYSTOLIC BLOOD PRESSURE: 137 MMHG | OXYGEN SATURATION: 100 % | DIASTOLIC BLOOD PRESSURE: 74 MMHG | TEMPERATURE: 98.4 F | RESPIRATION RATE: 18 BRPM

## 2025-06-02 DIAGNOSIS — D64.9 ANEMIA, UNSPECIFIED TYPE: ICD-10-CM

## 2025-06-02 LAB
ABO + RH BLD: NORMAL
ANION GAP SERPL CALCULATED.3IONS-SCNC: 13 MMOL/L (ref 7–15)
BASOPHILS # BLD AUTO: 0 10E3/UL (ref 0–0.2)
BASOPHILS NFR BLD AUTO: 1 %
BLD GP AB SCN SERPL QL: NEGATIVE
BLD PROD TYP BPU: NORMAL
BLOOD COMPONENT TYPE: NORMAL
BUN SERPL-MCNC: 7.4 MG/DL (ref 6–20)
CALCIUM SERPL-MCNC: 8.8 MG/DL (ref 8.8–10.4)
CHLORIDE SERPL-SCNC: 99 MMOL/L (ref 98–107)
CODING SYSTEM: NORMAL
CREAT SERPL-MCNC: 0.78 MG/DL (ref 0.51–0.95)
CROSSMATCH: NORMAL
DACRYOCYTES BLD QL SMEAR: SLIGHT
EGFRCR SERPLBLD CKD-EPI 2021: >90 ML/MIN/1.73M2
ELLIPTOCYTES BLD QL SMEAR: SLIGHT
EOSINOPHIL # BLD AUTO: 0.1 10E3/UL (ref 0–0.7)
EOSINOPHIL NFR BLD AUTO: 1 %
ERYTHROCYTE [DISTWIDTH] IN BLOOD BY AUTOMATED COUNT: 20.5 % (ref 10–15)
GLUCOSE SERPL-MCNC: 302 MG/DL (ref 70–99)
HCO3 SERPL-SCNC: 22 MMOL/L (ref 22–29)
HCT VFR BLD AUTO: 26.8 % (ref 35–47)
HGB BLD-MCNC: 7 G/DL (ref 11.7–15.7)
IMM GRANULOCYTES # BLD: 0 10E3/UL
IMM GRANULOCYTES NFR BLD: 1 %
LYMPHOCYTES # BLD AUTO: 1.5 10E3/UL (ref 0.8–5.3)
LYMPHOCYTES NFR BLD AUTO: 23 %
MCH RBC QN AUTO: 15 PG (ref 26.5–33)
MCHC RBC AUTO-ENTMCNC: 26.1 G/DL (ref 31.5–36.5)
MCV RBC AUTO: 58 FL (ref 78–100)
MONOCYTES # BLD AUTO: 0.5 10E3/UL (ref 0–1.3)
MONOCYTES NFR BLD AUTO: 7 %
NEUTROPHILS # BLD AUTO: 4.5 10E3/UL (ref 1.6–8.3)
NEUTROPHILS NFR BLD AUTO: 68 %
NRBC # BLD AUTO: 0 10E3/UL
NRBC BLD AUTO-RTO: 0 /100
PLAT MORPH BLD: ABNORMAL
PLATELET # BLD AUTO: 314 10E3/UL (ref 150–450)
POLYCHROMASIA BLD QL SMEAR: SLIGHT
POTASSIUM SERPL-SCNC: 3.6 MMOL/L (ref 3.4–5.3)
RBC # BLD AUTO: 4.66 10E6/UL (ref 3.8–5.2)
RBC MORPH BLD: ABNORMAL
SODIUM SERPL-SCNC: 134 MMOL/L (ref 135–145)
SPECIMEN EXP DATE BLD: NORMAL
UNIT ABO/RH: NORMAL
UNIT NUMBER: NORMAL
UNIT STATUS: NORMAL
UNIT TYPE ISBT: 5100
WBC # BLD AUTO: 6.7 10E3/UL (ref 4–11)

## 2025-06-02 PROCEDURE — 85004 AUTOMATED DIFF WBC COUNT: CPT | Performed by: EMERGENCY MEDICINE

## 2025-06-02 PROCEDURE — 36415 COLL VENOUS BLD VENIPUNCTURE: CPT | Performed by: EMERGENCY MEDICINE

## 2025-06-02 PROCEDURE — 82310 ASSAY OF CALCIUM: CPT | Performed by: EMERGENCY MEDICINE

## 2025-06-02 PROCEDURE — 99285 EMERGENCY DEPT VISIT HI MDM: CPT | Mod: 25

## 2025-06-02 PROCEDURE — 86923 COMPATIBILITY TEST ELECTRIC: CPT | Performed by: EMERGENCY MEDICINE

## 2025-06-02 PROCEDURE — 250N000013 HC RX MED GY IP 250 OP 250 PS 637: Performed by: EMERGENCY MEDICINE

## 2025-06-02 PROCEDURE — 86901 BLOOD TYPING SEROLOGIC RH(D): CPT | Performed by: EMERGENCY MEDICINE

## 2025-06-02 PROCEDURE — 99285 EMERGENCY DEPT VISIT HI MDM: CPT

## 2025-06-02 RX ORDER — IBUPROFEN 200 MG
400 TABLET ORAL ONCE
Status: DISCONTINUED | OUTPATIENT
Start: 2025-06-02 | End: 2025-06-02

## 2025-06-02 RX ORDER — ACETAMINOPHEN 500 MG
1000 TABLET ORAL ONCE
Status: COMPLETED | OUTPATIENT
Start: 2025-06-02 | End: 2025-06-02

## 2025-06-02 RX ADMIN — ACETAMINOPHEN 1000 MG: 500 TABLET, FILM COATED ORAL at 20:21

## 2025-06-02 ASSESSMENT — ACTIVITIES OF DAILY LIVING (ADL)
ADLS_ACUITY_SCORE: 41

## 2025-06-02 ASSESSMENT — COLUMBIA-SUICIDE SEVERITY RATING SCALE - C-SSRS
1. IN THE PAST MONTH, HAVE YOU WISHED YOU WERE DEAD OR WISHED YOU COULD GO TO SLEEP AND NOT WAKE UP?: NO
2. HAVE YOU ACTUALLY HAD ANY THOUGHTS OF KILLING YOURSELF IN THE PAST MONTH?: NO
6. HAVE YOU EVER DONE ANYTHING, STARTED TO DO ANYTHING, OR PREPARED TO DO ANYTHING TO END YOUR LIFE?: NO

## 2025-06-02 NOTE — ED PROVIDER NOTES
Emergency Department Note      History of Present Illness     Chief Complaint   Dizziness      OLLIE Vazquez is a 40 year old female with history of anemia, hyperlipidemia, and type 2 diabetes, who presents to the ED for evaluation of dizziness. Patient reports that she didn't know she had anemia until recently. She states that she went to her doctor for diabetes to call her primary and get rechecked. She reports that she went to her OBGYN today and informed her based of her symptoms that she should go into the ED to be evaluated. She states that she has had loss of consciousness once in the past seven days. She reports that since she has not been walking or exercising due to risk to loss of consciousness. She states that she has had some chest pain yesterday that has since resolved but developed some lower back pain today. She reports that she has heavy period which pass clots. She endorses that she has her menstrual periods every month. She states that they believe that she is anemic due to her heavy periods. She reports that she has a low blood count and usually has iron infusions since she had a reaction the last time she had a blood transfusion. She endorses that she presented with skin itching, and skin dryness. She states that she has not gotten her period yet this month but had her last around 5/2/25.     Independent Historian   None    Review of External Notes   I reviewed a family visit note from today 6/2/25 office called her regarding her results. I reviewed a note from 5/28/25 patient was seen for iron infusion.     Past Medical History     Medical History and Problem List   Anemia  Gastric bypass  Cervical high risk HPV  GERD  Type 2 diabetes  Chronic idiopathic constipation  Hepatic steatosis  Hyperlipidemia  Gastric sleeve procedure  Vasovagal syncope    Medications   Ozempic   Pepcid  Glucophage  Prilosec    Surgical History   Esophagoscopy, gastroscopy, duodenoscopy combined    Physical  Exam     Patient Vitals for the past 24 hrs:   BP Temp Temp src Pulse Resp SpO2   06/02/25 1955 137/74 98.4  F (36.9  C) Oral 90 18 --   06/02/25 1736 -- 98.5  F (36.9  C) -- -- -- --   06/02/25 1734 135/79 -- -- -- -- --   06/02/25 1733 -- -- -- 92 18 100 %     Physical Exam  General: alert, lying comfortably on gurney  HENT: mucous membranes moist  CV: regular rate, regular rhythm, 2/6 EMANUEL.  Resp: normal effort, clear throughout, no crackles or wheezing  GI: abdomen soft and nontender, no guarding  MSK: no bony tenderness  Skin: appropriately warm and dry  Extremities: no edema, calves non-tender  Neuro: alert, clear speech, oriented  Psych: normal mood and affect      Diagnostics     Lab Results   Labs Ordered and Resulted from Time of ED Arrival to Time of ED Departure   BASIC METABOLIC PANEL - Abnormal       Result Value    Sodium 134 (*)     Potassium 3.6      Chloride 99      Carbon Dioxide (CO2) 22      Anion Gap 13      Urea Nitrogen 7.4      Creatinine 0.78      GFR Estimate >90      Calcium 8.8      Glucose 302 (*)    CBC WITH PLATELETS AND DIFFERENTIAL - Abnormal    WBC Count 6.7      RBC Count 4.66      Hemoglobin 7.0 (*)     Hematocrit 26.8 (*)     MCV 58 (*)     MCH 15.0 (*)     MCHC 26.1 (*)     RDW 20.5 (*)     Platelet Count 314      % Neutrophils 68      % Lymphocytes 23      % Monocytes 7      % Eosinophils 1      % Basophils 1      % Immature Granulocytes 1      NRBCs per 100 WBC 0      Absolute Neutrophils 4.5      Absolute Lymphocytes 1.5      Absolute Monocytes 0.5      Absolute Eosinophils 0.1      Absolute Basophils 0.0      Absolute Immature Granulocytes 0.0      Absolute NRBCs 0.0     RBC AND PLATELET MORPHOLOGY - Abnormal    RBC Morphology Confirmed RBC Indices      Platelet Assessment        Value: Automated Count Confirmed. Platelet morphology is normal.    Elliptocytes Slight (*)     Polychromasia Slight (*)     Teardrop Cells Slight (*)    TYPE AND SCREEN, ADULT    ABO/RH(D) O POS       Antibody Screen Negative      SPECIMEN EXPIRATION DATE 6/5/2025 11:59:00 PM CDT     PREPARE RED BLOOD CELLS (UNIT)    Blood Component Type Red Blood Cells      Product Code [id number]      Unit Status Ready for issue      Unit Number [id number]      CROSSMATCH Compatible      CODING SYSTEM [id number]     ABO/RH TYPE AND SCREEN       Imaging   No orders to display       EKG   ECG results from 06/02/25   EKG 12 lead     Value    Systolic Blood Pressure     Diastolic Blood Pressure     Ventricular Rate 89    Atrial Rate 89    DE Interval 162    QRS Duration 72        QTc 433    P Axis 58    R AXIS 24    T Axis 39    Interpretation ECG      Sinus rhythm  Low voltage QRS  Borderline ECG  When compared with ECG of 07-Jan-2023 10:54,  No significant change was found  Reviewed by Binta Batista MD at 1853          Independent Interpretation   None    ED Course      Medications Administered   Medications   ibuprofen (ADVIL/MOTRIN) tablet 400 mg (has no administration in time range)       Procedures   Procedures     Discussion of Management   None    ED Course   ED Course as of 06/02/25 1945 Mon Jun 02, 2025 1804 I obtained history and examined the patient as noted above.    1934 I went to follow up with this patient regarding their findings and discuss treatment plans.    1944 I rechecked the patient, explained results.  She does not wish to proceed with blood transfusion given history of adverse reaction in the past.  She has been ambulatory to the bathroom without difficulty.   1944 I rechecked the patient. We discussed plans for discharge and the patient is comfortable with this plan. Instructed on supportive care, medications, and reasons to return.        Additional Documentation  None    Medical Decision Making / Diagnosis     CMS Diagnoses: None    MIPS   None               [facility]   Sheila Vazquez is a 40 year old female with a history of iron deficiency anemia, presenting with dizziness in the setting of  worsening anemia.  On exam, the patient has normal vitals, does have a flow murmur.  EKG does not show signs of ischemia or acute ischemia.  In the ED, we repeated blood work, which demonstrates hemoglobin of 7.0, MCV of 58.  Patient was also noted to have hyperglycemia, but no evidence for DKA.  On recheck, I discussed doing blood transfusion.  She is very reluctant to do this as she had sensitivity to transfuse blood in the past.  Ultimately, she decided to be discharged without transfusion.  She does have iron transfusion scheduled in 3 days, and plans to keep that appointment.  We discussed risks and benefits of the blood transfusion, including risks of hypoperfusion, and that likely contributing to symptoms of dizziness.  However, she would prefer to return home, and I think she has full decisional capacity.  She understands that given that her anemia is related to menstrual cycles and that she is likely to start her menstrual cycle in the next 1 to 2 days, her hemoglobin is likely to continue to drift down.  However, she continues to want a go home.  Return to the ED for increased dizziness, syncope, chest pain, heavy vaginal bleeding, or for any other concerns.    Disposition   The patient was discharged.     Diagnosis     ICD-10-CM    1. Anemia, unspecified type  D64.9          Scribe Disclosure:  I, Mag Pedro, am serving as a scribe at 5:53 PM on 6/2/2025 to document services personally performed by Binta Batista MD based on my observations and the provider's statements to me.        Binta Batista MD  06/02/25 2612

## 2025-06-03 LAB
ATRIAL RATE - MUSE: 89 BPM
DIASTOLIC BLOOD PRESSURE - MUSE: NORMAL MMHG
INTERPRETATION ECG - MUSE: NORMAL
P AXIS - MUSE: 58 DEGREES
PR INTERVAL - MUSE: 162 MS
QRS DURATION - MUSE: 72 MS
QT - MUSE: 356 MS
QTC - MUSE: 433 MS
R AXIS - MUSE: 24 DEGREES
SYSTOLIC BLOOD PRESSURE - MUSE: NORMAL MMHG
T AXIS - MUSE: 39 DEGREES
VENTRICULAR RATE- MUSE: 89 BPM

## 2025-06-03 NOTE — DISCHARGE INSTRUCTIONS
As we discussed, your blood counts today or hemoglobin are 7.0.  This is usually when we would consider doing a blood transfusion.  You have decided not to proceed with blood transfusion today.  We discussed that you are high risk for your blood counts going even lower given that you have not yet started your period.  On your blood work, your blood type is O+.  Your blood sugar was elevated today at 302.     I recommend keeping your appointment for the iron transfusion in 3 days.  Return to the ED if you feel weaker, develop heavy vaginal bleeding, increased dizziness, passing out, chest pain, shortness of breath, or for any other concerns.

## 2025-06-05 ENCOUNTER — INFUSION THERAPY VISIT (OUTPATIENT)
Dept: INFUSION THERAPY | Facility: CLINIC | Age: 40
End: 2025-06-05
Payer: COMMERCIAL

## 2025-06-05 VITALS
SYSTOLIC BLOOD PRESSURE: 120 MMHG | RESPIRATION RATE: 18 BRPM | HEART RATE: 72 BPM | TEMPERATURE: 98.1 F | DIASTOLIC BLOOD PRESSURE: 78 MMHG | OXYGEN SATURATION: 100 %

## 2025-06-05 DIAGNOSIS — D50.0 IRON DEFICIENCY ANEMIA DUE TO CHRONIC BLOOD LOSS: Primary | ICD-10-CM

## 2025-06-05 PROCEDURE — 258N000003 HC RX IP 258 OP 636

## 2025-06-05 PROCEDURE — 250N000011 HC RX IP 250 OP 636: Mod: JZ

## 2025-06-05 RX ORDER — METHYLPREDNISOLONE SODIUM SUCCINATE 40 MG/ML
40 INJECTION INTRAMUSCULAR; INTRAVENOUS
Start: 2025-06-12

## 2025-06-05 RX ORDER — ALBUTEROL SULFATE 90 UG/1
1-2 INHALANT RESPIRATORY (INHALATION)
Start: 2025-06-12

## 2025-06-05 RX ORDER — DIPHENHYDRAMINE HYDROCHLORIDE 50 MG/ML
50 INJECTION, SOLUTION INTRAMUSCULAR; INTRAVENOUS
Start: 2025-06-12

## 2025-06-05 RX ORDER — HEPARIN SODIUM,PORCINE 10 UNIT/ML
5-20 VIAL (ML) INTRAVENOUS DAILY PRN
OUTPATIENT
Start: 2025-06-12

## 2025-06-05 RX ORDER — ALBUTEROL SULFATE 0.83 MG/ML
2.5 SOLUTION RESPIRATORY (INHALATION)
OUTPATIENT
Start: 2025-06-12

## 2025-06-05 RX ORDER — EPINEPHRINE 1 MG/ML
0.3 INJECTION, SOLUTION INTRAMUSCULAR; SUBCUTANEOUS EVERY 5 MIN PRN
OUTPATIENT
Start: 2025-06-12

## 2025-06-05 RX ORDER — DIPHENHYDRAMINE HYDROCHLORIDE 50 MG/ML
25 INJECTION, SOLUTION INTRAMUSCULAR; INTRAVENOUS
Start: 2025-06-12

## 2025-06-05 RX ORDER — HEPARIN SODIUM (PORCINE) LOCK FLUSH IV SOLN 100 UNIT/ML 100 UNIT/ML
5 SOLUTION INTRAVENOUS
OUTPATIENT
Start: 2025-06-12

## 2025-06-05 RX ORDER — MEPERIDINE HYDROCHLORIDE 25 MG/ML
25 INJECTION INTRAMUSCULAR; INTRAVENOUS; SUBCUTANEOUS
OUTPATIENT
Start: 2025-06-12

## 2025-06-05 RX ADMIN — SODIUM CHLORIDE 250 ML: 0.9 INJECTION, SOLUTION INTRAVENOUS at 14:56

## 2025-06-05 RX ADMIN — FERRIC CARBOXYMALTOSE INJECTION 750 MG: 50 INJECTION, SOLUTION INTRAVENOUS at 15:00

## 2025-06-05 NOTE — PROGRESS NOTES
"Infusion Nursing Note:  Sheila Vazquez presents today for Injectafer #1/2.    Patient seen by provider today: No   present during visit today: Not Applicable.    Note: Pt is familiar with infusion center and has received injectafer in the past.  Medication education reinforced.      15 minutes into the observation period, Leobardo stated she \"felt funny\" and like she had trouble catching her breath.  Vitals obtained and they were stable.  She also c/o a headache but this too happened after dose of injectafer in 2024. Writer reached out to Candace Rodriguez PA-C to notify her of the situation.  At the end of the 30 minute observation, all symptoms were resolved and vitals were stable.  YnesJuany Rodriguez was comfortable with patient discharging home but she was instructed to go to the ER if chest heaviness or any other symptoms return.      Intravenous Access:  Peripheral IV placed.    Treatment Conditions:   05/22/25 14:09 06/02/25 18:20   Iron 14 (L)    Iron Binding Capacity 386    Iron Sat Index 4 (L)    Hemoglobin 7.3 (L) 7.0 (L)   (L): Data is abnormally low  (L): Data is abnormally low  Results reviewed, labs MET treatment parameters, ok to proceed with treatment.      Post Infusion Assessment:  Patient tolerated infusion without incident.  Patient observed for 30 minutes post injectafer per protocol.  Blood return noted pre and post infusion.  Site patent and intact, free from redness, edema or discomfort.  No evidence of extravasations.  Access discontinued per protocol.       Discharge Plan:   Discharge instructions reviewed with: Patient.  Patient and/or family verbalized understanding of discharge instructions and all questions answered.  AVS to patient via [employer].  Patient will return 6/12 for next appointment.   Patient discharged in stable condition accompanied by: self.  Departure Mode: Ambulatory.      Tony Nugent RN   "

## 2025-06-16 ENCOUNTER — INFUSION THERAPY VISIT (OUTPATIENT)
Dept: INFUSION THERAPY | Facility: CLINIC | Age: 40
End: 2025-06-16
Payer: COMMERCIAL

## 2025-06-16 VITALS
OXYGEN SATURATION: 100 % | DIASTOLIC BLOOD PRESSURE: 74 MMHG | SYSTOLIC BLOOD PRESSURE: 105 MMHG | TEMPERATURE: 98.6 F | RESPIRATION RATE: 18 BRPM | HEART RATE: 75 BPM

## 2025-06-16 DIAGNOSIS — D50.0 IRON DEFICIENCY ANEMIA DUE TO CHRONIC BLOOD LOSS: Primary | ICD-10-CM

## 2025-06-16 PROCEDURE — 96365 THER/PROPH/DIAG IV INF INIT: CPT

## 2025-06-16 PROCEDURE — 250N000011 HC RX IP 250 OP 636: Mod: JZ

## 2025-06-16 PROCEDURE — 258N000003 HC RX IP 258 OP 636

## 2025-06-16 RX ORDER — METHYLPREDNISOLONE SODIUM SUCCINATE 40 MG/ML
40 INJECTION INTRAMUSCULAR; INTRAVENOUS
Status: CANCELLED
Start: 2025-06-19

## 2025-06-16 RX ORDER — DIPHENHYDRAMINE HYDROCHLORIDE 50 MG/ML
50 INJECTION, SOLUTION INTRAMUSCULAR; INTRAVENOUS
Status: CANCELLED
Start: 2025-06-19

## 2025-06-16 RX ORDER — EPINEPHRINE 1 MG/ML
0.3 INJECTION, SOLUTION INTRAMUSCULAR; SUBCUTANEOUS EVERY 5 MIN PRN
Status: CANCELLED | OUTPATIENT
Start: 2025-06-19

## 2025-06-16 RX ORDER — DIPHENHYDRAMINE HYDROCHLORIDE 50 MG/ML
25 INJECTION, SOLUTION INTRAMUSCULAR; INTRAVENOUS
Status: CANCELLED
Start: 2025-06-19

## 2025-06-16 RX ORDER — MEPERIDINE HYDROCHLORIDE 25 MG/ML
25 INJECTION INTRAMUSCULAR; INTRAVENOUS; SUBCUTANEOUS
Status: CANCELLED | OUTPATIENT
Start: 2025-06-19

## 2025-06-16 RX ORDER — HEPARIN SODIUM,PORCINE 10 UNIT/ML
5-20 VIAL (ML) INTRAVENOUS DAILY PRN
Status: CANCELLED | OUTPATIENT
Start: 2025-06-19

## 2025-06-16 RX ORDER — HEPARIN SODIUM (PORCINE) LOCK FLUSH IV SOLN 100 UNIT/ML 100 UNIT/ML
5 SOLUTION INTRAVENOUS
Status: CANCELLED | OUTPATIENT
Start: 2025-06-19

## 2025-06-16 RX ORDER — ALBUTEROL SULFATE 90 UG/1
1-2 INHALANT RESPIRATORY (INHALATION)
Status: CANCELLED
Start: 2025-06-19

## 2025-06-16 RX ORDER — ALBUTEROL SULFATE 0.83 MG/ML
2.5 SOLUTION RESPIRATORY (INHALATION)
Status: CANCELLED | OUTPATIENT
Start: 2025-06-19

## 2025-06-16 RX ADMIN — SODIUM CHLORIDE 250 ML: 0.9 INJECTION, SOLUTION INTRAVENOUS at 08:35

## 2025-06-16 RX ADMIN — FERRIC CARBOXYMALTOSE INJECTION 750 MG: 50 INJECTION, SOLUTION INTRAVENOUS at 08:35

## 2025-06-16 NOTE — PROGRESS NOTES
Infusion Nursing Note:  Sheila Vazquez presents today for Injectafer #2/2.    Patient seen by provider today: No   present during visit today: Not Applicable.    Note: Sheila is feeling okay. She did have some palpitations with last infusion, opted to decrease rate of injectafer today- run over 25 min vs 15. Pt tolerated this without difficulty.      Intravenous Access:  Peripheral IV placed.    Treatment Conditions:  Not Applicable.      Post Infusion Assessment:  Patient tolerated infusion without incident.  Patient observed for 30 minutes post Injectafer per protocol.  Blood return noted following infusion  Site patent and intact, free from redness, edema or discomfort.  No evidence of extravasations.  Access discontinued per protocol.       Discharge Plan:   Discharge instructions reviewed with: Patient.  Patient and/or family verbalized understanding of discharge instructions and all questions answered.  AVS to patient via [employer].  Patient will return prn for next appointment.   Patient discharged in stable condition accompanied by: self.  Departure Mode: Ambulatory.      Valeri Conde RN

## 2025-07-10 ENCOUNTER — APPOINTMENT (OUTPATIENT)
Dept: ULTRASOUND IMAGING | Facility: CLINIC | Age: 40
End: 2025-07-10
Attending: EMERGENCY MEDICINE
Payer: COMMERCIAL

## 2025-07-10 ENCOUNTER — HOSPITAL ENCOUNTER (EMERGENCY)
Facility: CLINIC | Age: 40
Discharge: HOME | End: 2025-07-10
Attending: EMERGENCY MEDICINE
Payer: COMMERCIAL

## 2025-07-10 VITALS
BODY MASS INDEX: 34.76 KG/M2 | DIASTOLIC BLOOD PRESSURE: 92 MMHG | WEIGHT: 216.27 LBS | TEMPERATURE: 97 F | HEART RATE: 71 BPM | SYSTOLIC BLOOD PRESSURE: 132 MMHG | HEIGHT: 66 IN | OXYGEN SATURATION: 100 % | RESPIRATION RATE: 16 BRPM

## 2025-07-10 DIAGNOSIS — R10.11 RUQ ABDOMINAL PAIN: ICD-10-CM

## 2025-07-10 DIAGNOSIS — K76.0 HEPATIC STEATOSIS: ICD-10-CM

## 2025-07-10 LAB
ALBUMIN SERPL BCG-MCNC: 3.9 G/DL (ref 3.5–5.2)
ALBUMIN UR-MCNC: NEGATIVE MG/DL
ALP SERPL-CCNC: 90 U/L (ref 40–150)
ALT SERPL W P-5'-P-CCNC: 11 U/L (ref 0–50)
ANION GAP SERPL CALCULATED.3IONS-SCNC: 9 MMOL/L (ref 7–15)
APPEARANCE UR: ABNORMAL
AST SERPL W P-5'-P-CCNC: 15 U/L (ref 0–45)
BACTERIA #/AREA URNS HPF: ABNORMAL /HPF
BASOPHILS # BLD AUTO: 0 10E3/UL (ref 0–0.2)
BASOPHILS NFR BLD AUTO: 1 %
BILIRUB DIRECT SERPL-MCNC: <0.08 MG/DL (ref 0–0.3)
BILIRUB SERPL-MCNC: 0.2 MG/DL
BILIRUB UR QL STRIP: NEGATIVE
BUN SERPL-MCNC: 7.7 MG/DL (ref 6–20)
CALCIUM SERPL-MCNC: 8.5 MG/DL (ref 8.8–10.4)
CHLORIDE SERPL-SCNC: 103 MMOL/L (ref 98–107)
COLOR UR AUTO: ABNORMAL
CREAT SERPL-MCNC: 0.49 MG/DL (ref 0.51–0.95)
EGFRCR SERPLBLD CKD-EPI 2021: >90 ML/MIN/1.73M2
EOSINOPHIL # BLD AUTO: 0.1 10E3/UL (ref 0–0.7)
EOSINOPHIL NFR BLD AUTO: 2 %
ERYTHROCYTE [DISTWIDTH] IN BLOOD BY AUTOMATED COUNT: ABNORMAL %
GLUCOSE SERPL-MCNC: 219 MG/DL (ref 70–99)
GLUCOSE UR STRIP-MCNC: 1000 MG/DL
HCG UR QL: NEGATIVE
HCO3 SERPL-SCNC: 21 MMOL/L (ref 22–29)
HCT VFR BLD AUTO: 41 % (ref 35–47)
HGB BLD-MCNC: 12.8 G/DL (ref 11.7–15.7)
HGB UR QL STRIP: NEGATIVE
HOLD SPECIMEN: NORMAL
HOLD SPECIMEN: NORMAL
IMM GRANULOCYTES # BLD: 0 10E3/UL
IMM GRANULOCYTES NFR BLD: 0 %
KETONES UR STRIP-MCNC: NEGATIVE MG/DL
LEUKOCYTE ESTERASE UR QL STRIP: NEGATIVE
LIPASE SERPL-CCNC: 12 U/L (ref 13–60)
LYMPHOCYTES # BLD AUTO: 1.5 10E3/UL (ref 0.8–5.3)
LYMPHOCYTES NFR BLD AUTO: 29 %
MCH RBC QN AUTO: 23.8 PG (ref 26.5–33)
MCHC RBC AUTO-ENTMCNC: 31.2 G/DL (ref 31.5–36.5)
MCV RBC AUTO: 76 FL (ref 78–100)
MONOCYTES # BLD AUTO: 0.3 10E3/UL (ref 0–1.3)
MONOCYTES NFR BLD AUTO: 6 %
MUCOUS THREADS #/AREA URNS LPF: PRESENT /LPF
NEUTROPHILS # BLD AUTO: 3.1 10E3/UL (ref 1.6–8.3)
NEUTROPHILS NFR BLD AUTO: 62 %
NITRATE UR QL: NEGATIVE
NRBC # BLD AUTO: 0 10E3/UL
NRBC BLD AUTO-RTO: 0 /100
PH UR STRIP: 6.5 [PH] (ref 5–7)
PLAT MORPH BLD: NORMAL
PLATELET # BLD AUTO: 347 10E3/UL (ref 150–450)
POTASSIUM SERPL-SCNC: 4 MMOL/L (ref 3.4–5.3)
PROT SERPL-MCNC: 6.6 G/DL (ref 6.4–8.3)
RBC # BLD AUTO: 5.38 10E6/UL (ref 3.8–5.2)
RBC MORPH BLD: NORMAL
RBC URINE: 1 /HPF
SODIUM SERPL-SCNC: 133 MMOL/L (ref 135–145)
SP GR UR STRIP: 1.01 (ref 1–1.03)
SQUAMOUS EPITHELIAL: 6 /HPF
UROBILINOGEN UR STRIP-MCNC: NORMAL MG/DL
WBC # BLD AUTO: 5 10E3/UL (ref 4–11)
WBC URINE: 12 /HPF

## 2025-07-10 PROCEDURE — 250N000013 HC RX MED GY IP 250 OP 250 PS 637: Performed by: EMERGENCY MEDICINE

## 2025-07-10 PROCEDURE — 83690 ASSAY OF LIPASE: CPT | Performed by: EMERGENCY MEDICINE

## 2025-07-10 PROCEDURE — 80048 BASIC METABOLIC PNL TOTAL CA: CPT | Performed by: EMERGENCY MEDICINE

## 2025-07-10 PROCEDURE — 81025 URINE PREGNANCY TEST: CPT | Performed by: EMERGENCY MEDICINE

## 2025-07-10 PROCEDURE — 84155 ASSAY OF PROTEIN SERUM: CPT | Performed by: EMERGENCY MEDICINE

## 2025-07-10 PROCEDURE — 81003 URINALYSIS AUTO W/O SCOPE: CPT | Performed by: EMERGENCY MEDICINE

## 2025-07-10 PROCEDURE — 36415 COLL VENOUS BLD VENIPUNCTURE: CPT | Performed by: EMERGENCY MEDICINE

## 2025-07-10 PROCEDURE — 87186 SC STD MICRODIL/AGAR DIL: CPT | Performed by: EMERGENCY MEDICINE

## 2025-07-10 PROCEDURE — 250N000011 HC RX IP 250 OP 636: Performed by: EMERGENCY MEDICINE

## 2025-07-10 PROCEDURE — 76705 ECHO EXAM OF ABDOMEN: CPT

## 2025-07-10 PROCEDURE — 85004 AUTOMATED DIFF WBC COUNT: CPT | Performed by: EMERGENCY MEDICINE

## 2025-07-10 PROCEDURE — 99284 EMERGENCY DEPT VISIT MOD MDM: CPT | Mod: 25 | Performed by: EMERGENCY MEDICINE

## 2025-07-10 RX ORDER — ONDANSETRON 4 MG/1
4 TABLET, ORALLY DISINTEGRATING ORAL EVERY 8 HOURS PRN
Qty: 10 TABLET | Refills: 0 | Status: SHIPPED | OUTPATIENT
Start: 2025-07-10 | End: 2025-07-13

## 2025-07-10 RX ORDER — ACETAMINOPHEN 500 MG
1000 TABLET ORAL ONCE
Status: COMPLETED | OUTPATIENT
Start: 2025-07-10 | End: 2025-07-10

## 2025-07-10 RX ORDER — ONDANSETRON 4 MG/1
4 TABLET, ORALLY DISINTEGRATING ORAL ONCE
Status: COMPLETED | OUTPATIENT
Start: 2025-07-10 | End: 2025-07-10

## 2025-07-10 RX ADMIN — ONDANSETRON 4 MG: 4 TABLET, ORALLY DISINTEGRATING ORAL at 15:16

## 2025-07-10 RX ADMIN — ACETAMINOPHEN 1000 MG: 500 TABLET, FILM COATED ORAL at 15:16

## 2025-07-10 ASSESSMENT — COLUMBIA-SUICIDE SEVERITY RATING SCALE - C-SSRS
1. IN THE PAST MONTH, HAVE YOU WISHED YOU WERE DEAD OR WISHED YOU COULD GO TO SLEEP AND NOT WAKE UP?: NO
6. HAVE YOU EVER DONE ANYTHING, STARTED TO DO ANYTHING, OR PREPARED TO DO ANYTHING TO END YOUR LIFE?: NO
2. HAVE YOU ACTUALLY HAD ANY THOUGHTS OF KILLING YOURSELF IN THE PAST MONTH?: NO

## 2025-07-10 ASSESSMENT — ACTIVITIES OF DAILY LIVING (ADL)
ADLS_ACUITY_SCORE: 41
ADLS_ACUITY_SCORE: 41

## 2025-07-10 NOTE — ED TRIAGE NOTES
Patient reports right sided abdominal pain for a few days.  Reports nausea and burning with urination.  Hx gastric bypass and c section.  ABCs intact, A&Ox4     Triage Assessment (Adult)       Row Name 07/10/25 4246          Triage Assessment    Airway WDL WDL        Respiratory WDL    Respiratory WDL WDL        Skin Circulation/Temperature WDL    Skin Circulation/Temperature WDL WDL        Cardiac WDL    Cardiac WDL WDL        Peripheral/Neurovascular WDL    Peripheral Neurovascular WDL WDL        Cognitive/Neuro/Behavioral WDL    Cognitive/Neuro/Behavioral WDL WDL

## 2025-07-10 NOTE — DISCHARGE INSTRUCTIONS
Your gallbladder appears normal and your blood levels are also reassuring.  You do not have a kidney stone or urinary tract infection.  We are giving you a medication for Zofran which you can take if you are feeling nauseous.  Be sure that you are drinking plenty of fluids to stay hydrated, and take over-the-counter pain medication as needed. We are placing a referral order for you to establish care with a primary care provider.  Expect a phone call in the next 1-2 business days from the  to help set up an appointment.  Please return to the emergency department if you develop any new or concerning symptoms.

## 2025-07-10 NOTE — ED PROVIDER NOTES
Emergency Department Note      History of Present Illness     Chief Complaint   Abdominal Pain      HPI   Sheila Vazquez is a 40 year old female presenting with right upper quadrant abdominal pain.  She reports her pain has been off and on for the past month, but last night the pain started and has been constant.  She tried taking Tylenol last night.  She also endorses nausea and did have an episode of vomiting.  Previous abdominal surgeries consist of a gastric bypass and .  She also endorses dysuria.  She denies fever, chills, chest pain, shortness of breath, black or bloody emesis, black or bloody stool, constipation, diarrhea, hematuria.    Independent Historian   None    Review of External Notes   25: ED note reviewed    Past Medical History     Medical History and Problem List   Past Medical History:   Diagnosis Date    Central pterygium of both eyes 2017       Medications   ondansetron (ZOFRAN ODT) 4 MG ODT tab  Blood Glucose Monitoring Suppl (ACCU-CHEK GUIDE) w/Device KIT  famotidine (PEPCID) 40 MG tablet  ferrous gluconate (FERGON) 324 (38 Fe) MG tablet  metFORMIN (GLUCOPHAGE XR) 500 MG 24 hr tablet  Multiple Vitamins-Minerals (BARIATRIC MULTIVITAMINS/IRON) CAPS  omeprazole (PRILOSEC) 20 MG DR capsule  polyethylene glycol (MIRALAX) 17 GM/Dose powder  prednisoLONE acetate (PRED FORTE) 1 % ophthalmic suspension  semaglutide (OZEMPIC) 2 MG/1.5ML SOPN pen  Semaglutide, 1 MG/DOSE, (OZEMPIC) 4 MG/3ML pen  vitamin D3 (CHOLECALCIFEROL) 1.25 MG (11437 UT) capsule        Surgical History   Past Surgical History:   Procedure Laterality Date    ESOPHAGOSCOPY, GASTROSCOPY, DUODENOSCOPY (EGD), COMBINED N/A 3/6/2024    Procedure: Esophagoscopy, gastroscopy, duodenoscopy (EGD), combined;  Surgeon: Lamine Bryan MD;  Location:  GI       Physical Exam     Patient Vitals for the past 24 hrs:   BP Temp Temp src Pulse Resp SpO2 Height Weight   07/10/25 1632 (!) 132/92 -- -- 71 16 100 % -- --  "  07/10/25 1517 116/80 -- -- -- -- 100 % -- --   07/10/25 1336 138/86 97  F (36.1  C) Temporal 93 16 100 % 1.676 m (5' 6\") 98.1 kg (216 lb 4.3 oz)     Physical Exam  General: No acute distress  Head: No obvious trauma to head.  Ears, Nose, Throat:  External ears normal.  Nose normal.  No pharyngeal erythema, swelling or exudate.  Midline uvula. Moist mucus membranes.  Eyes:  Conjunctivae clear.   Neck: Normal range of motion.  Neck supple.   CV: Regular rate and rhythm.  No murmurs.      Respiratory: Effort normal and breath sounds normal.  No wheezing or crackles.   Gastrointestinal: Soft.  No distension. RUQ tenderness to palpation. Positive Todd's sign.  There is no rigidity, no rebound and no guarding.   Musculoskeletal: Normal range of motion.  Non tender extremities to palpations. No lower extremity edema  Neuro: Alert. Moving all extremities appropriately.  Normal speech.    Skin: Skin is warm and dry.  No rash noted.   Psych: Normal mood and affect. Behavior is normal.       Diagnostics     Lab Results   Labs Ordered and Resulted from Time of ED Arrival to Time of ED Departure   ROUTINE UA WITH MICROSCOPIC REFLEX TO CULTURE - Abnormal       Result Value    Color Urine Light Yellow      Appearance Urine Slightly Cloudy (*)     Glucose Urine 1000 (*)     Bilirubin Urine Negative      Ketones Urine Negative      Specific Gravity Urine 1.009      Blood Urine Negative      pH Urine 6.5      Protein Albumin Urine Negative      Urobilinogen Urine Normal      Nitrite Urine Negative      Leukocyte Esterase Urine Negative      Bacteria Urine Few (*)     Mucus Urine Present (*)     RBC Urine 1      WBC Urine 12 (*)     Squamous Epithelials Urine 6 (*)    BASIC METABOLIC PANEL - Abnormal    Sodium 133 (*)     Potassium 4.0      Chloride 103      Carbon Dioxide (CO2) 21 (*)     Anion Gap 9      Urea Nitrogen 7.7      Creatinine 0.49 (*)     GFR Estimate >90      Calcium 8.5 (*)     Glucose 219 (*)    CBC WITH PLATELETS " AND DIFFERENTIAL - Abnormal    WBC Count 5.0      RBC Count 5.38 (*)     Hemoglobin 12.8      Hematocrit 41.0      MCV 76 (*)     MCH 23.8 (*)     MCHC 31.2 (*)     RDW        Platelet Count 347      % Neutrophils 62      % Lymphocytes 29      % Monocytes 6      % Eosinophils 2      % Basophils 1      % Immature Granulocytes 0      NRBCs per 100 WBC 0      Absolute Neutrophils 3.1      Absolute Lymphocytes 1.5      Absolute Monocytes 0.3      Absolute Eosinophils 0.1      Absolute Basophils 0.0      Absolute Immature Granulocytes 0.0      Absolute NRBCs 0.0     LIPASE - Abnormal    Lipase 12 (*)    HEPATIC FUNCTION PANEL - Normal    Protein Total 6.6      Albumin 3.9      Bilirubin Total 0.2      Alkaline Phosphatase 90      AST 15      ALT 11      Bilirubin Direct <0.08     HCG QUALITATIVE URINE - Normal    hCG Urine Qualitative Negative     RBC AND PLATELET MORPHOLOGY    RBC Morphology Confirmed RBC Indices      Platelet Assessment        Value: Automated Count Confirmed. Platelet morphology is normal.   URINE CULTURE       Imaging   US Abdomen Limited   Final Result   IMPRESSION:   1.  Decompressed gallbladder without evidence of cholecystitis or biliary obstruction.   2.  Diffuse hepatic steatosis, which can be a source of abdominal pain.                Independent Interpretation   None    ED Course      Medications Administered   Medications   acetaminophen (TYLENOL) tablet 1,000 mg (1,000 mg Oral $Given 7/10/25 1516)   ondansetron (ZOFRAN ODT) ODT tab 4 mg (4 mg Oral $Given 7/10/25 1516)       Procedures   Procedures     Discussion of Management   None    ED Course        Additional Documentation  None    Medical Decision Making / Diagnosis     CMS Diagnoses: None    MIPS   None               [facility]   Sheila Vazquez is a 40 year old female presenting with right upper quadrant abdominal pain.  LFTs and lipase are grossly unremarkable.  No leukocytosis.  Right upper quadrant ultrasound shows no evidence of  gallstone or cholecystitis.  There is evidence of hepatic steatosis.  The patient does not have a primary care provider.  I am placing a referral order for her to establish care.  I am also giving a prescription for Zofran.  She is instructed to stay hydrated and eat a bland diet over the coming days.  Strict return precautions are given and she verbalizes understanding.  She is discharged home in stable condition.      Disposition   The patient was discharged.     Diagnosis     ICD-10-CM    1. RUQ abdominal pain  R10.11 Primary Care Referral      2. Hepatic steatosis  K76.0 Primary Care Referral           Discharge Medications   Discharge Medication List as of 7/10/2025  4:48 PM        START taking these medications    Details   ondansetron (ZOFRAN ODT) 4 MG ODT tab Take 1 tablet (4 mg) by mouth every 8 hours as needed for nausea., Disp-10 tablet, R-0, E-Prescribe               MD Lidia Berry Stephen, MD  07/10/25 0645

## 2025-07-12 ENCOUNTER — TELEPHONE (OUTPATIENT)
Dept: NURSING | Facility: CLINIC | Age: 40
End: 2025-07-12
Payer: COMMERCIAL

## 2025-07-12 LAB — BACTERIA UR CULT: ABNORMAL

## 2025-07-12 NOTE — TELEPHONE ENCOUNTER
[facility] MD Fransisco Lord MD Victorino Ned, MD. Jose Carlos Coronado MD   Progress Note    6/4/2019    SUBJECTIVE:   On RA   Braden in place   K up to 6   Cr continue to creep up   PROBLEM LIST:    Principal Problem:    Acute renal failure (ARF) ([address]  Resolved Problems:    * No resolved hospital problems. *       DIET:    DIET RENAL;       Allergies : Patient has no known allergies. Past Medical History:   Diagnosis Date    Anxiety     Depression     Hyperlipidemia     Hypertension     Hypothyroidism     Intellectual disability     Leg pain, bilateral     Noncompliance with medications     Noncompliance with treatment     Osteoarthritis        Past Surgical History:   Procedure Laterality Date    CYSTOSCOPY Left 1/21/2019    CYSTOSCOPY, BILATERAL RETROGRADE PYELOGRAMS, BILATERAL STENT INSERTION performed by Anastasiia Smalls MD at [address] [facility] Bilateral 6/1/2019    CYSTOSCOPY, RETROGRADE PYELOGRAMS, BILATERAL URETERAL STENT EXCHANGE performed by Ana Naqvi MD at [address]       Family History   Problem Relation Age of Onset    Diabetes Brother     Thyroid Disease Mother     Other Daughter         Autism        reports that she quit smoking about 30 years ago. She has a 5.00 pack-year smoking history. She has never used smokeless tobacco. She reports that she does not drink alcohol or use drugs. Review of Systems:   Constitutional: no fevers , no chills , feels ok   Eyes: no eye pain , no itching , no drainage  Ears, nose, mouth, throat, and face: no ear ,nose pain , hearing is ok ,no nasal drainage   Respiratory: no sob ,no cough ,no wheezing . Cardiovascular: no chest pain , no palpitation ,no sob . Gastrointestinal: no nausea, vomiting , constipation , no abdominal pain . Genitourinary:no urinary retention , no burning , dysuria . No polyuria   Hematologic/lymphatic: no bleeding , no cougulation issues .    Musculoskeletal:no joint [facility]    Reason for call: Lab Result Notification     Lab Result (including Rx patient on, if applicable).  If culture, copy of lab report at bottom.  Lab Result: urine culture  ED RX = Zofran prn    Creatinine Level (mg/dl)   Creatinine   Date Value Ref Range Status   07/10/2025 0.49 (L) 0.51 - 0.95 mg/dL Final   02/26/2020 0.56 0.52 - 1.04 mg/dL Final    Creatinine clearance (ml/min), if applicable    Serum creatinine: 0.49 mg/dL (L) 07/10/25 1452  Estimated creatinine clearance: 176.6 mL/min (A)     Patient's current Symptoms:   Sheila reports she is feeling worse, constant upper abdominal pain, nausea and urinary frequency. Denies vomiting. She agreed to return to the ED for reevaluation.   ED symptoms = right upper quadrant abdominal pain, nausea and vomited x1.  RN Recommendations/Instructions per [facility] ED lab result protocol:   [facility] ED lab result protocol utilized: urine culture    Patient/care giver notified to contact your PCP clinic or return to the Emergency department if your:    Symptoms worsen or other concerning symptoms.    Priscilla Prado, RN

## 2025-07-14 ENCOUNTER — APPOINTMENT (OUTPATIENT)
Dept: CT IMAGING | Facility: CLINIC | Age: 40
End: 2025-07-14
Attending: EMERGENCY MEDICINE
Payer: COMMERCIAL

## 2025-07-14 ENCOUNTER — HOSPITAL ENCOUNTER (EMERGENCY)
Facility: CLINIC | Age: 40
Discharge: HOME | End: 2025-07-14
Attending: EMERGENCY MEDICINE
Payer: COMMERCIAL

## 2025-07-14 VITALS
SYSTOLIC BLOOD PRESSURE: 144 MMHG | OXYGEN SATURATION: 99 % | HEART RATE: 79 BPM | TEMPERATURE: 97 F | DIASTOLIC BLOOD PRESSURE: 98 MMHG | WEIGHT: 213.63 LBS | BODY MASS INDEX: 34.33 KG/M2 | RESPIRATION RATE: 18 BRPM | HEIGHT: 66 IN

## 2025-07-14 DIAGNOSIS — R10.13 ABDOMINAL PAIN, EPIGASTRIC: ICD-10-CM

## 2025-07-14 DIAGNOSIS — E27.9 ADRENAL NODULE: ICD-10-CM

## 2025-07-14 LAB
ALBUMIN SERPL BCG-MCNC: 4.1 G/DL (ref 3.5–5.2)
ALBUMIN UR-MCNC: 30 MG/DL
ALP SERPL-CCNC: 84 U/L (ref 40–150)
ALT SERPL W P-5'-P-CCNC: 12 U/L (ref 0–50)
ANION GAP SERPL CALCULATED.3IONS-SCNC: 10 MMOL/L (ref 7–15)
APPEARANCE UR: ABNORMAL
AST SERPL W P-5'-P-CCNC: 12 U/L (ref 0–45)
BACTERIA #/AREA URNS HPF: ABNORMAL /HPF
BASOPHILS # BLD AUTO: 0 10E3/UL (ref 0–0.2)
BASOPHILS NFR BLD AUTO: 1 %
BILIRUB SERPL-MCNC: 0.2 MG/DL
BILIRUB UR QL STRIP: NEGATIVE
BUN SERPL-MCNC: 8.3 MG/DL (ref 6–20)
CALCIUM SERPL-MCNC: 8.6 MG/DL (ref 8.8–10.4)
CHLORIDE SERPL-SCNC: 102 MMOL/L (ref 98–107)
COLOR UR AUTO: YELLOW
CREAT SERPL-MCNC: 0.52 MG/DL (ref 0.51–0.95)
EGFRCR SERPLBLD CKD-EPI 2021: >90 ML/MIN/1.73M2
EOSINOPHIL # BLD AUTO: 0.1 10E3/UL (ref 0–0.7)
EOSINOPHIL NFR BLD AUTO: 2 %
ERYTHROCYTE [DISTWIDTH] IN BLOOD BY AUTOMATED COUNT: 26.7 % (ref 10–15)
GLUCOSE SERPL-MCNC: 217 MG/DL (ref 70–99)
GLUCOSE UR STRIP-MCNC: 200 MG/DL
HCG SERPL QL: NEGATIVE
HCO3 SERPL-SCNC: 23 MMOL/L (ref 22–29)
HCT VFR BLD AUTO: 42.3 % (ref 35–47)
HGB BLD-MCNC: 13.5 G/DL (ref 11.7–15.7)
HGB UR QL STRIP: NEGATIVE
HOLD SPECIMEN: NORMAL
IMM GRANULOCYTES # BLD: 0 10E3/UL
IMM GRANULOCYTES NFR BLD: 0 %
KETONES UR STRIP-MCNC: NEGATIVE MG/DL
LEUKOCYTE ESTERASE UR QL STRIP: ABNORMAL
LIPASE SERPL-CCNC: 12 U/L (ref 13–60)
LYMPHOCYTES # BLD AUTO: 1.2 10E3/UL (ref 0.8–5.3)
LYMPHOCYTES NFR BLD AUTO: 32 %
MCH RBC QN AUTO: 24.8 PG (ref 26.5–33)
MCHC RBC AUTO-ENTMCNC: 31.9 G/DL (ref 31.5–36.5)
MCV RBC AUTO: 78 FL (ref 78–100)
MONOCYTES # BLD AUTO: 0.2 10E3/UL (ref 0–1.3)
MONOCYTES NFR BLD AUTO: 6 %
MUCOUS THREADS #/AREA URNS LPF: PRESENT /LPF
NEUTROPHILS # BLD AUTO: 2.3 10E3/UL (ref 1.6–8.3)
NEUTROPHILS NFR BLD AUTO: 60 %
NITRATE UR QL: NEGATIVE
NRBC # BLD AUTO: 0 10E3/UL
NRBC BLD AUTO-RTO: 0 /100
PH UR STRIP: 6 [PH] (ref 5–7)
PLATELET # BLD AUTO: 298 10E3/UL (ref 150–450)
POTASSIUM SERPL-SCNC: 4.1 MMOL/L (ref 3.4–5.3)
PROT SERPL-MCNC: 6.6 G/DL (ref 6.4–8.3)
RBC # BLD AUTO: 5.45 10E6/UL (ref 3.8–5.2)
RBC URINE: 3 /HPF
SODIUM SERPL-SCNC: 135 MMOL/L (ref 135–145)
SP GR UR STRIP: 1.02 (ref 1–1.03)
SQUAMOUS EPITHELIAL: 16 /HPF
UROBILINOGEN UR STRIP-MCNC: NORMAL MG/DL
WBC # BLD AUTO: 3.9 10E3/UL (ref 4–11)
WBC URINE: 22 /HPF

## 2025-07-14 PROCEDURE — 84703 CHORIONIC GONADOTROPIN ASSAY: CPT | Performed by: EMERGENCY MEDICINE

## 2025-07-14 PROCEDURE — 36415 COLL VENOUS BLD VENIPUNCTURE: CPT | Performed by: EMERGENCY MEDICINE

## 2025-07-14 PROCEDURE — 87088 URINE BACTERIA CULTURE: CPT | Performed by: EMERGENCY MEDICINE

## 2025-07-14 PROCEDURE — 99285 EMERGENCY DEPT VISIT HI MDM: CPT | Mod: 25 | Performed by: EMERGENCY MEDICINE

## 2025-07-14 PROCEDURE — 250N000009 HC RX 250: Performed by: EMERGENCY MEDICINE

## 2025-07-14 PROCEDURE — 80051 ELECTROLYTE PANEL: CPT | Performed by: EMERGENCY MEDICINE

## 2025-07-14 PROCEDURE — 250N000011 HC RX IP 250 OP 636: Performed by: EMERGENCY MEDICINE

## 2025-07-14 PROCEDURE — 81003 URINALYSIS AUTO W/O SCOPE: CPT | Performed by: EMERGENCY MEDICINE

## 2025-07-14 PROCEDURE — 83690 ASSAY OF LIPASE: CPT | Performed by: EMERGENCY MEDICINE

## 2025-07-14 PROCEDURE — 74177 CT ABD & PELVIS W/CONTRAST: CPT

## 2025-07-14 PROCEDURE — 85025 COMPLETE CBC W/AUTO DIFF WBC: CPT | Performed by: EMERGENCY MEDICINE

## 2025-07-14 RX ORDER — IOPAMIDOL 755 MG/ML
500 INJECTION, SOLUTION INTRAVASCULAR ONCE
Status: COMPLETED | OUTPATIENT
Start: 2025-07-14 | End: 2025-07-14

## 2025-07-14 RX ORDER — DICYCLOMINE HCL 20 MG
20 TABLET ORAL 2 TIMES DAILY
Qty: 20 TABLET | Refills: 0 | Status: SHIPPED | OUTPATIENT
Start: 2025-07-14 | End: 2025-07-24

## 2025-07-14 RX ADMIN — SODIUM CHLORIDE 100 ML: 9 INJECTION, SOLUTION INTRAVENOUS at 10:26

## 2025-07-14 RX ADMIN — IOPAMIDOL 100 ML: 755 INJECTION, SOLUTION INTRAVENOUS at 10:26

## 2025-07-14 ASSESSMENT — ACTIVITIES OF DAILY LIVING (ADL)
ADLS_ACUITY_SCORE: 41
ADLS_ACUITY_SCORE: 41

## 2025-07-14 ASSESSMENT — COLUMBIA-SUICIDE SEVERITY RATING SCALE - C-SSRS
2. HAVE YOU ACTUALLY HAD ANY THOUGHTS OF KILLING YOURSELF IN THE PAST MONTH?: NO
1. IN THE PAST MONTH, HAVE YOU WISHED YOU WERE DEAD OR WISHED YOU COULD GO TO SLEEP AND NOT WAKE UP?: NO
6. HAVE YOU EVER DONE ANYTHING, STARTED TO DO ANYTHING, OR PREPARED TO DO ANYTHING TO END YOUR LIFE?: NO

## 2025-07-14 NOTE — DISCHARGE INSTRUCTIONS
We recommend that you follow-up closely with your primary care provider in the next several days to discuss your ER visit.  You can try taking the medication given here to see if it helps with your discomfort.  Your urine here today does not look like an obvious infection and looks more like a contaminated specimen.    It was also noted on your CT scan that you have a small nodule on the adrenal gland on the left side.  Likely this has been your whole life and is not causing you any symptoms but is important to know about and have some close follow-up.    Return to the emergency department at any point for any worsening severe pain, persistent vomiting or for any other new or concerning symptoms.

## 2025-07-14 NOTE — ED PROVIDER NOTES
"  Emergency Department Note      History of Present Illness     Chief Complaint   Abdominal Pain      HPI   Sheila Vazquez is a 40 year old female with a history of type 2 diabetes, GERD, mixed hyperlipidemia, and essential hypertension, who presents to the emergency department today for evaluation of abdominal pain. The patient reports she was in the ED on 07/10/2025 for RUQ abdominal pain which has been persistent for 1 month. This has been accompanied by fevers and chills without dysuria or urinary frequency. At this visit, Sheila gave a urine culture, which she was informed of today was positive for bacteria, was told to return to the ED. Sheila denies any fevers or chills.     Independent Historian   None    Review of External Notes   I reviewed ED note from 07/10/2025 regarding patient's presentation for RUQ abdominal pain and hepatic steatosis.  I reviewed urine culture from 07/10/2025 regarding patient's presentation for abdominal pain.     Staphylococcus epidermidis       CHARLEEN     Ciprofloxacin Susceptible     Daptomycin Susceptible     Doxycycline Susceptible     Gentamicin Susceptible     Levofloxacin Susceptible     Nitrofurantoin Susceptible     Oxacillin Susceptible 1     Tetracycline Susceptible     Trimethoprim/Sulfamethoxazole Susceptible     Vancomycin Susceptible              1 Oxacillin susceptible isolates are susceptible to cephalosporins (example: cefazolin and cephalexin) and beta lactam combination agents. Oxacillin resistant isolates are resistant to these agents.         Susceptibility Comments    Staphylococcus epidermidis   Antibiotics listed as \"No Interpretation\" have no regulatory guidelines for susceptibility/resistance available.       Past Medical History     Medical History and Problem List   Class 1 obesity   GERD  Hepatic steatosis  Iron deficiency anemia due to chronic blood loss  S/P gastric sleeve bypass  Cervical high risk HPV test positive  Type 2 diabetes  Mixed " "hyperlipidemia  Chronic idiopathic constipation  Essential hypertension     Medications   famotidine   ferrous gluconate   metFORMIN   Multiple Vitamins-Minerals   omeprazole   polyethylene glycol   prednisoLONE acetate   semaglutide  Semaglutide  vitamin D3     Surgical History   EGD combined   section    Physical Exam     Patient Vitals for the past 24 hrs:   BP Temp Temp src Pulse Resp SpO2 Height Weight   25 1114 (!) 144/98 -- -- 79 18 99 % -- --   25 0835 (!) 127/99 97  F (36.1  C) Temporal 70 16 100 % 1.676 m (5' 6\") 96.9 kg (213 lb 10 oz)     Physical Exam  Nursing note and vitals reviewed.  HENT:   Mouth/Throat: Oropharynx is clear and moist.   Eyes: Conjunctivae and EOM are normal. Pupils are equal, round, and reactive to light.   Cardiovascular: Normal rate, regular rhythm and normal heart sounds.    Pulmonary/Chest: Effort normal and breath sounds normal.   Abdominal: Soft. Bowel sounds are normal. Mild epigastric tenderness without rebound or guarding.   Musculoskeletal: The patient exhibits no edema.   Neurological: The patient is alert.        No facial droop or focal extremity weakness.   Skin: Skin is warm and dry. No rash noted.   Psychiatric: The patient has a normal mood and affect. The patient's behavior is normal.      Diagnostics     Lab Results   Labs Ordered and Resulted from Time of ED Arrival to Time of ED Departure   ROUTINE UA WITH MICROSCOPIC REFLEX TO CULTURE - Abnormal       Result Value    Color Urine Yellow      Appearance Urine Slightly Cloudy (*)     Glucose Urine 200 (*)     Bilirubin Urine Negative      Ketones Urine Negative      Specific Gravity Urine 1.025      Blood Urine Negative      pH Urine 6.0      Protein Albumin Urine 30 (*)     Urobilinogen Urine Normal      Nitrite Urine Negative      Leukocyte Esterase Urine Trace (*)     Bacteria Urine Few (*)     Mucus Urine Present (*)     RBC Urine 3 (*)     WBC Urine 22 (*)     Squamous Epithelials Urine 16 " (*)    COMPREHENSIVE METABOLIC PANEL - Abnormal    Sodium 135      Potassium 4.1      Carbon Dioxide (CO2) 23      Anion Gap 10      Urea Nitrogen 8.3      Creatinine 0.52      GFR Estimate >90      Calcium 8.6 (*)     Chloride 102      Glucose 217 (*)     Alkaline Phosphatase 84      AST 12      ALT 12      Protein Total 6.6      Albumin 4.1      Bilirubin Total 0.2     LIPASE - Abnormal    Lipase 12 (*)    CBC WITH PLATELETS AND DIFFERENTIAL - Abnormal    WBC Count 3.9 (*)     RBC Count 5.45 (*)     Hemoglobin 13.5      Hematocrit 42.3      MCV 78      MCH 24.8 (*)     MCHC 31.9      RDW 26.7 (*)     Platelet Count 298      % Neutrophils 60      % Lymphocytes 32      % Monocytes 6      % Eosinophils 2      % Basophils 1      % Immature Granulocytes 0      NRBCs per 100 WBC 0      Absolute Neutrophils 2.3      Absolute Lymphocytes 1.2      Absolute Monocytes 0.2      Absolute Eosinophils 0.1      Absolute Basophils 0.0      Absolute Immature Granulocytes 0.0      Absolute NRBCs 0.0     HCG QUALITATIVE PREGNANCY - Normal    hCG Serum Qualitative Negative     URINE CULTURE     Imaging   CT Abdomen Pelvis w Contrast   Final Result   IMPRESSION:    1.  No acute findings in the abdomen and pelvis.   2.  Cholecystectomy and sleeve gastrectomy.   3.  Hepatic steatosis.   4.  A 1.8 cm left adrenal nodule is indeterminate. Consider a follow-up adrenal protocol CT or MRI.           Independent Interpretation   None    ED Course      Medications Administered   Medications   sodium chloride 0.9 % bag for CT scan flush (100 mLs As instructed $Given 7/14/25 1026)   iopamidol (ISOVUE-370) solution 500 mL (100 mLs Intravenous $Given 7/14/25 1026)       Procedures   Procedures     Discussion of Management   None    ED Course   ED Course as of 07/14/25 1208   Mon Jul 14, 2025 0924 I obtained history and examined the patient as noted above.     1115 I explained findings to the patient and we discussed plan for discharge. The  patient is comfortable with this plan.     Additional Documentation  None    Medical Decision Making / Diagnosis     CMS Diagnoses: None    MIPS   None    [facility]   Sheila Vazquez is a 40 year old female with a history of gastric bypass along with a cholecystectomy presenting to the emergency department for persistent epigastric and right upper quadrant abdominal pain.  On evaluation here, she was overall well-appearing with normal vital signs.  She had some mild epigastric tenderness but no rebound or guarding.  Given that this is her second visit for similar symptoms, I did decide to expand the workup to include a CT scan to evaluate for possible intra-abdominal etiology or complication from her gastric bypass.  Fortunately her CT is unremarkable.  It does show a small adrenal nodule which the patient was informed of.  Of note, her urine here appears to be contaminated and she did have a urine culture approximately 4 days ago that grew out Staph epidermidis.  She is not having any urinary symptoms and do not feel at this point that this is contributing to her abdominal pain or requires treatment at this time.  She was instructed to follow-up very closely with her primary care provider in regards to her pain and also this adrenal nodule.  She was given strict return precautions.    Disposition   The patient was discharged.     Diagnosis     ICD-10-CM    1. Abdominal pain, epigastric  R10.13       2. Adrenal nodule  E27.9            Discharge Medications   Discharge Medication List as of 7/14/2025 11:29 AM        START taking these medications    Details   dicyclomine (BENTYL) 20 MG tablet Take 1 tablet (20 mg) by mouth 2 times daily for 10 days., Disp-20 tablet, R-0, E-Prescribe           Scribe Disclosure:  Prasanth JENSEN, am serving as a scribe at 10:22 AM on 7/14/2025 to document services personally performed by Mino Dwyer DO based on my observations and the provider's statements to me.        Reymundo  Mino MAHONEY DO  07/14/25 120

## 2025-07-14 NOTE — ED TRIAGE NOTES
Patient reports being seen recently in ED for abdominal pain.  Reports being called yesterday and told to come back due to positive urine cultures.  Reports symptoms have also worsened.  ABCs intact, A&Ox4     Triage Assessment (Adult)       Row Name 07/14/25 0836          Triage Assessment    Airway WDL WDL        Respiratory WDL    Respiratory WDL WDL        Skin Circulation/Temperature WDL    Skin Circulation/Temperature WDL WDL        Cardiac WDL    Cardiac WDL WDL        Peripheral/Neurovascular WDL    Peripheral Neurovascular WDL WDL        Cognitive/Neuro/Behavioral WDL    Cognitive/Neuro/Behavioral WDL WDL

## 2025-07-15 ENCOUNTER — TELEPHONE (OUTPATIENT)
Dept: NURSING | Facility: CLINIC | Age: 40
End: 2025-07-15
Payer: COMMERCIAL

## 2025-07-15 LAB — BACTERIA UR CULT: ABNORMAL

## 2025-07-15 NOTE — TELEPHONE ENCOUNTER
[facility]    Reason for call: Lab Result Notification     Lab Result (including Rx patient on, if applicable).  If culture, copy of lab report at bottom.  Lab Result: Urine Culture - see below    ED Rx: dicyclomine (BENTYL) 20 MG tablet - Take 1 tablet (20 mg) by mouth 2 times daily for 10 days     Creatinine Level (mg/dl)   Creatinine   Date Value Ref Range Status   07/14/2025 0.52 0.51 - 0.95 mg/dL Final   02/26/2020 0.56 0.52 - 1.04 mg/dL Final    Creatinine clearance (ml/min), if applicable    Serum creatinine: 0.52 mg/dL 07/14/25 0938  Estimated creatinine clearance: 165.3 mL/min     ED Symptoms: Patient presented to [facility] ED on 07/14/25 for evaluation of worsening abdominal pain. Pt was seen in ED on 7/10/25 for similar symptoms and UC grew out staph epidermidis.    RN Recommendations/Instructions per [place] ED lab result protocol:   [facility] ED lab result protocol utilized: Urine Culture  Recommend nitrofurantoin if experiencing symptoms      Unable to reach patient/caregiver.     Left voicemail message requesting a call back to [phone] between 9 a.m. and 5:30 p.m. for patient's ED/UC lab results.      Letter pended to be sent via import2.    CHRIS Davenport RN

## 2025-07-15 NOTE — TELEPHONE ENCOUNTER
Patient calling back. States that she is still having abdominal pain but denies any urinary symptoms. She states she has an appointment tomorrow and will take to the provider about antibiotics at that time.     IVETTE BUENO RN

## 2025-07-15 NOTE — LETTER
July 15, 2025        Sheila Vazquez  [id number]   [place] [id number]          Dear Sheila Vazquez:    You were seen in the [facility] Emergency Department at [facility] on 7/14/2025.  We are unable to reach you by phone, so we are sending you this letter.     It is important that you call [facility] Emergency Department lab result nurse at [phone], as we have information to relay to you AND/OR we MAY have to make some changes in your treatment.    Best time to call back is between 9AM and 5:30PM, 7 days a week.      Sincerely,     [facility] Emergency Department Lab Result RN  [phone]

## 2025-07-16 ENCOUNTER — OFFICE VISIT (OUTPATIENT)
Dept: FAMILY MEDICINE | Facility: CLINIC | Age: 40
End: 2025-07-16
Attending: EMERGENCY MEDICINE
Payer: COMMERCIAL

## 2025-07-16 VITALS
OXYGEN SATURATION: 100 % | HEART RATE: 84 BPM | RESPIRATION RATE: 14 BRPM | HEIGHT: 66 IN | DIASTOLIC BLOOD PRESSURE: 80 MMHG | TEMPERATURE: 98.3 F | WEIGHT: 212.6 LBS | SYSTOLIC BLOOD PRESSURE: 112 MMHG | BODY MASS INDEX: 34.17 KG/M2

## 2025-07-16 DIAGNOSIS — N30.00 ACUTE CYSTITIS WITHOUT HEMATURIA: ICD-10-CM

## 2025-07-16 DIAGNOSIS — B35.6 TINEA CRURIS: ICD-10-CM

## 2025-07-16 DIAGNOSIS — E11.9 TYPE 2 DIABETES MELLITUS WITHOUT COMPLICATION, WITHOUT LONG-TERM CURRENT USE OF INSULIN (H): ICD-10-CM

## 2025-07-16 DIAGNOSIS — Z09 FOLLOW-UP EXAM: Primary | ICD-10-CM

## 2025-07-16 PROCEDURE — 3079F DIAST BP 80-89 MM HG: CPT

## 2025-07-16 PROCEDURE — 3074F SYST BP LT 130 MM HG: CPT

## 2025-07-16 PROCEDURE — 99204 OFFICE O/P NEW MOD 45 MIN: CPT

## 2025-07-16 PROCEDURE — G2211 COMPLEX E/M VISIT ADD ON: HCPCS

## 2025-07-16 RX ORDER — CYCLOSPORINE 0.5 MG/ML
EMULSION OPHTHALMIC
COMMUNITY
Start: 2024-07-26

## 2025-07-16 RX ORDER — ERGOCALCIFEROL 1.25 MG/1
50000 CAPSULE, LIQUID FILLED ORAL WEEKLY
COMMUNITY

## 2025-07-16 RX ORDER — CEFDINIR 300 MG/1
300 CAPSULE ORAL 2 TIMES DAILY
Qty: 14 CAPSULE | Refills: 0 | Status: SHIPPED | OUTPATIENT
Start: 2025-07-16 | End: 2025-07-23

## 2025-07-16 RX ORDER — CLOTRIMAZOLE AND BETAMETHASONE DIPROPIONATE 10; .64 MG/G; MG/G
CREAM TOPICAL 2 TIMES DAILY
Qty: 45 G | Refills: 0 | Status: SHIPPED | OUTPATIENT
Start: 2025-07-16

## 2025-07-16 NOTE — PROGRESS NOTES
"ED FOLLOW UP:   Assessment & Plan   Problem List Items Addressed This Visit       Type 2 diabetes mellitus without complication, without long-term current use of insulin (H) (Chronic)     Last A1c 9.5 - Does not tolerate 2g of metformin. Currently taking 1 g daily.   Increase to 1500 mg daily.   Start empagliflozin 10 mg daily.   Follow up in 6-8 weeks to review A1c.   Decline DE consult.   Discussed low carb diet.     Relevant Medications    empagliflozin (JARDIANCE) 10 MG TABS tablet    metFORMIN (GLUCOPHAGE) 500 MG tablet    Other Relevant Orders    Hemoglobin A1c    Basic metabolic panel  (Ca, Cl, CO2, Creat, Gluc, K, Na, BUN)     Other Visit Diagnoses         Follow-up exam        Acute cystitis without hematuria    Not treated d/t concern for contaminate.   Pt remains symptomatic and culture grew staph epidermis.   Will treat with cefdinir x 7 days.       Relevant Medications    cefdinir (OMNICEF) 300 MG capsule          Tinea cruris        Relevant Medications        clotrimazole-betamethasone (LOTRISONE) 1-0.05 % external cream           MED REC REQUIRED  Post Medication Reconciliation Status:     BMI  Estimated body mass index is 34.31 kg/m  as calculated from the following:    Height as of this encounter: 1.676 m (5' 6\").    Weight as of this encounter: 96.4 kg (212 lb 9.6 oz).   Weight management plan: Discussed healthy diet and exercise guidelines    Follow-up    Follow-up Visit   Expected date:  Aug 16, 2025 (Approximate)      Follow Up Appointment Details:     Follow-up with whom?: Me    Follow-Up for what?: Chronic Disease f/u    Chronic Disease f/u: Diabetes    How?: In Person               The longitudinal plan of care for the diagnosis(es)/condition(s) as documented were addressed during this visit. Due to the added complexity in care, I will continue to support Sheila in the subsequent management and with ongoing continuity of care.    Harley Sosa is a 40 year old, presenting for the " "following health issues:  ER F/U      Via the Health Maintenance questionnaire, the patient has reported the following services have been completed -Eye Exam: [place] [facility] 2025, this information has been sent to the abstraction team.  HPI Sheilanay Vazquez, 40-year-old female  - History of diabetes, on metformin and previously on Ozempic (discontinued)  - Recurrent urinary symptoms; urine culture grew Staphylococcus aureus and Staph epidermidis  - Treated previously with a 3-tablet course of antibiotics, no improvement in urinary symptoms  - Reports abdominal pain, localized to the right upper abdomen  - Rash with dark pimples and black spots on the abdomen, not painful, located above  scar  - History of   - Reports heaviness and hanging sensation in the abdomen, with associated rash  - Prior episode of fungal infection when blood sugar was 14 during a stay in [place], treated with 3 tablets and resolved  - Difficulty tolerating higher doses of metformin (1000 mg twice daily) due to faintness and inability to walk  - Last hemoglobin A1c was 9.5 one month ago  - Reports significant weight gain in the past, up to nearly 300 lbs  - Reports glucose in urine  - No current pain from the abdominal rash, but concerned about appearance and possible relation to urinary symptoms      ED/UC Followup:    Facility:  [facility]  Date of visit: 2025  Reason for visit: Abdominal Pain  Current Status: Better, but comes and goes        Review of Systems  Constitutional, HEENT, cardiovascular, pulmonary, gi and gu systems are negative, except as otherwise noted.      Objective    /80 (BP Location: Right arm, Patient Position: Sitting, Cuff Size: Adult Large)   Pulse 84   Temp 98.3  F (36.8  C) (Oral)   Resp 14   Ht 1.676 m (5' 6\")   Wt 96.4 kg (212 lb 9.6 oz)   LMP 2025 (Exact Date)   SpO2 100%   BMI 34.31 kg/m    Body mass index is 34.31 kg/m .  Physical Exam "   GENERAL: alert and no distress  NECK: no adenopathy, no asymmetry, masses, or scars  RESP: lungs clear to auscultation - no rales, rhonchi or wheezes  CV: regular rate and rhythm, normal S1 S2, no S3 or S4, no murmur, click or rub, no peripheral edema  ABDOMEN: soft, nontender, no hepatosplenomegaly, no masses and bowel sounds normal  MS: no gross musculoskeletal defects noted, no edema  SKIN: erythema and white chalky discharge above the pubis.     Admission on 07/14/2025, Discharged on 07/14/2025   Component Date Value Ref Range Status    Color Urine 07/14/2025 Yellow  Colorless, Straw, Light Yellow, Yellow Final    Appearance Urine 07/14/2025 Slightly Cloudy (A)  Clear Final    Glucose Urine 07/14/2025 200 (A)  Negative mg/dL Final    Bilirubin Urine 07/14/2025 Negative  Negative Final    Ketones Urine 07/14/2025 Negative  Negative mg/dL Final    Specific Gravity Urine 07/14/2025 1.025  1.003 - 1.035 Final    Blood Urine 07/14/2025 Negative  Negative Final    pH Urine 07/14/2025 6.0  5.0 - 7.0 Final    Protein Albumin Urine 07/14/2025 30 (A)  Negative mg/dL Final    Urobilinogen Urine 07/14/2025 Normal  Normal mg/dL Final    Nitrite Urine 07/14/2025 Negative  Negative Final    Leukocyte Esterase Urine 07/14/2025 Trace (A)  Negative Final    Bacteria Urine 07/14/2025 Few (A)  None Seen /HPF Final    Mucus Urine 07/14/2025 Present (A)  None Seen /LPF Final    RBC Urine 07/14/2025 3 (H)  <=2 /HPF Final    WBC Urine 07/14/2025 22 (H)  <=5 /HPF Final    Squamous Epithelials Urine 07/14/2025 16 (H)  <=1 /HPF Final    Sodium 07/14/2025 135  135 - 145 mmol/L Final    Potassium 07/14/2025 4.1  3.4 - 5.3 mmol/L Final    Carbon Dioxide (CO2) 07/14/2025 23  22 - 29 mmol/L Final    Anion Gap 07/14/2025 10  7 - 15 mmol/L Final    Urea Nitrogen 07/14/2025 8.3  6.0 - 20.0 mg/dL Final    Creatinine 07/14/2025 0.52  0.51 - 0.95 mg/dL Final    GFR Estimate 07/14/2025 >90  >60 mL/min/1.73m2 Final    eGFR calculated using 2021  CKD-EPI equation.    Calcium 07/14/2025 8.6 (L)  8.8 - 10.4 mg/dL Final    Chloride 07/14/2025 102  98 - 107 mmol/L Final    Glucose 07/14/2025 217 (H)  70 - 99 mg/dL Final    Alkaline Phosphatase 07/14/2025 84  40 - 150 U/L Final    AST 07/14/2025 12  0 - 45 U/L Final    ALT 07/14/2025 12  0 - 50 U/L Final    Protein Total 07/14/2025 6.6  6.4 - 8.3 g/dL Final    Albumin 07/14/2025 4.1  3.5 - 5.2 g/dL Final    Bilirubin Total 07/14/2025 0.2  <=1.2 mg/dL Final    Lipase 07/14/2025 12 (L)  13 - 60 U/L Final    hCG Serum Qualitative 07/14/2025 Negative  Negative Final    This test is for screening purposes.  Results should be interpreted along with the clinical picture.  Confirmation testing is available if warranted by ordering [id number], HCG Quantitative Pregnancy.    WBC Count 07/14/2025 3.9 (L)  4.0 - 11.0 10e3/uL Final    RBC Count 07/14/2025 5.45 (H)  3.80 - 5.20 10e6/uL Final    Hemoglobin 07/14/2025 13.5  11.7 - 15.7 g/dL Final    Hematocrit 07/14/2025 42.3  35.0 - 47.0 % Final    MCV 07/14/2025 78  78 - 100 fL Final    MCH 07/14/2025 24.8 (L)  26.5 - 33.0 pg Final    MCHC 07/14/2025 31.9  31.5 - 36.5 g/dL Final    RDW 07/14/2025 26.7 (H)  10.0 - 15.0 % Final    Platelet Count 07/14/2025 298  150 - 450 10e3/uL Final    % Neutrophils 07/14/2025 60  % Final    % Lymphocytes 07/14/2025 32  % Final    % Monocytes 07/14/2025 6  % Final    % Eosinophils 07/14/2025 2  % Final    % Basophils 07/14/2025 1  % Final    % Immature Granulocytes 07/14/2025 0  % Final    NRBCs per 100 WBC 07/14/2025 0  <1 /100 Final    Absolute Neutrophils 07/14/2025 2.3  1.6 - 8.3 10e3/uL Final    Absolute Lymphocytes 07/14/2025 1.2  0.8 - 5.3 10e3/uL Final    Absolute Monocytes 07/14/2025 0.2  0.0 - 1.3 10e3/uL Final    Absolute Eosinophils 07/14/2025 0.1  0.0 - 0.7 10e3/uL Final    Absolute Basophils 07/14/2025 0.0  0.0 - 0.2 10e3/uL Final    Absolute Immature Granulocytes 07/14/2025 0.0  <=0.4 10e3/uL Final    Absolute NRBCs 07/14/2025 0.0   10e3/uL Final    Hold Specimen 07/14/2025 [facility]   Final    Culture 07/14/2025 >100,000 CFU/mL Staphylococcus epidermidis (A)   Final    Susceptibilities done on previous cultures           Signed Electronically by: KAREN Durham CNP

## 2025-07-21 ENCOUNTER — PATIENT OUTREACH (OUTPATIENT)
Dept: CARE COORDINATION | Facility: CLINIC | Age: 40
End: 2025-07-21
Payer: COMMERCIAL

## 2025-08-05 ENCOUNTER — TELEPHONE (OUTPATIENT)
Dept: FAMILY MEDICINE | Facility: CLINIC | Age: 40
End: 2025-08-05
Payer: COMMERCIAL

## (undated) RX ORDER — FENTANYL CITRATE 50 UG/ML
INJECTION, SOLUTION INTRAMUSCULAR; INTRAVENOUS
Status: DISPENSED
Start: 2024-03-06